# Patient Record
Sex: FEMALE | Race: WHITE | NOT HISPANIC OR LATINO | Employment: FULL TIME | URBAN - NONMETROPOLITAN AREA
[De-identification: names, ages, dates, MRNs, and addresses within clinical notes are randomized per-mention and may not be internally consistent; named-entity substitution may affect disease eponyms.]

---

## 2021-09-09 PROBLEM — R10.9 ABDOMINAL CRAMPING AFFECTING PREGNANCY: Status: ACTIVE | Noted: 2021-09-09

## 2021-09-09 PROBLEM — O26.899 ABDOMINAL CRAMPING AFFECTING PREGNANCY: Status: ACTIVE | Noted: 2021-09-09

## 2021-11-05 PROBLEM — O36.5931 IUGR (INTRAUTERINE GROWTH RESTRICTION) AFFECTING CARE OF MOTHER, THIRD TRIMESTER, FETUS 1: Status: ACTIVE | Noted: 2021-11-05

## 2023-04-24 ENCOUNTER — TELEMEDICINE (OUTPATIENT)
Dept: BEHAVIORAL HEALTH | Facility: CLINIC | Age: 23
End: 2023-04-24
Payer: COMMERCIAL

## 2023-04-24 DIAGNOSIS — F41.1 GENERALIZED ANXIETY DISORDER: Primary | ICD-10-CM

## 2023-04-24 PROCEDURE — 90832 PSYTX W PT 30 MINUTES: CPT | Performed by: COUNSELOR

## 2023-05-08 NOTE — PROGRESS NOTES
Telehealth Video Therapy Visit      Date: 2023     Patient Name: Flavia Mcclure  : 2000   Time In: 3:20  Time Out: 3:39    Disclosure: You have chosen to receive care through a video visit today. Do you consent to use the phone and/or a video visit for your behavioral health today? Yes       Chief Complaint:      ICD-10-CM ICD-9-CM   1. Generalized anxiety disorder  F41.1 300.02       History of Present Illness: Flavia Mcclure is a 22 y.o. female that has agreed to be seen via a telehealth visit today. Flavia Mcclure has stated that they are in a secure environment for this session. [facility] [address] [facility] The patient is seen remotely at her  home, using Epic Video Visit (HIPAA compliant).  Patient states that she has been a little sick with a sinus infection.  Besides having a sinus infection, the patient states that she has been depressed and anxious regarding her work.  The patient's work is ending and her last date is Friday, because it is seasonal.  The patient states that she will be able to draw unemployment, and will start again in October or November timeframe.  Discussed with the patient that she will be having to do a new timeframe and new schedule.  Encouraged the patient and her  to do a family meeting at the end of the week to discuss menus and meal plans.  Discussed with the patient had her  has been spending a lot of money eating out.  The patient is appropriate for a telemedicine visit. I identified myself Anat Bello [facility]  along with my credentials of [facility].@ can refuse to be seen remotely at any time. The electronic data is encrypted and password protected, and the patient has been advised of the potential risks to privacy, not withstanding such measures.    Subjective      Review of Systems:   The following portions of the patient's history were reviewed and updated as  "appropriate: allergies, current medications, past family history, past medical history, past social history, past surgical history and problem list.    Review of Systems    Past Medical History:   Past Medical History:   Diagnosis Date   • Asthma    • Bipolar disorder    • Chronic bronchitis    • Depression    • Elevated cholesterol    • Frequent UTI    • GERD (gastroesophageal reflux disease)    • Hypothyroidism     \"born with only 1/2 my thyroid\"    • Kidney infection    • Migraines    • PCOS (polycystic ovarian syndrome)    • PONV (postoperative nausea and vomiting)    • Pre-existing type 2 diabetes affecting pregnancy, antepartum     dx age 13   • PTSD (post-traumatic stress disorder)    • Schizoaffective disorder        Past Surgical History:   Past Surgical History:   Procedure Laterality Date   • ANAL FISTULA REPAIR  2022   •  SECTION Bilateral 2021    Procedure:  SECTION PRIMARY;  Surgeon: Irineo Freire DO;  Location: [facility] LABOR DELIVERY;  Service: Obstetrics/Gynecology;  Laterality: Bilateral;   • CHOLECYSTECTOMY     • COLONOSCOPY     • DIAGNOSTIC LAPAROSCOPY N/A 2022    Procedure: DIAGNOSTIC LAPAROSCOPY with Lysis of Adhesions;  Surgeon: Irineo Freire DO;  Location: [facility] OR;  Service: Obstetrics/Gynecology;  Laterality: N/A;   • ENDOSCOPY     • TONSILLECTOMY         Family History:   Family History   Problem Relation Age of Onset   • Asthma Maternal Grandmother    • Asthma Paternal Grandmother    • Cancer Other    • Diabetes Other    • Heart attack Other    • Hyperlipidemia Other    • Hypertension Other    • Thyroid disease Other    • Migraines Other        Social History:   Social History     Socioeconomic History   • Marital status:    Tobacco Use   • Smoking status: Former     Types: Cigarettes     Quit date:      Years since quittin.3   • Smokeless tobacco: Never   Vaping Use   • Vaping Use: Every day   • Substances: Nicotine, " Flavoring   • Devices: Pre-filled or refillable cartridge   Substance and Sexual Activity   • Alcohol use: Yes     Comment: occasional   • Drug use: Not Currently     Types: Marijuana     Comment: last use 2018   • Sexual activity: Defer     Partners: Male     Birth control/protection: None       Medications:     Current Outpatient Medications:   •  atorvastatin (LIPITOR) 20 MG tablet, Take 1 tablet by mouth Daily., Disp: , Rfl:   •  cholecalciferol (VITAMIN D3) 25 MCG (1000 UT) tablet, Take 1 tablet by mouth Daily., Disp: , Rfl:   •  Continuous Blood Gluc Sensor (Dexcom G6 Sensor), , Disp: , Rfl:   •  Continuous Blood Gluc Transmit (Dexcom G6 Transmitter) misc, , Disp: , Rfl:   •  desvenlafaxine (Pristiq) 100 MG 24 hr tablet, Take 1 tablet by mouth Daily., Disp: 30 tablet, Rfl: 1  •  desvenlafaxine (PRISTIQ) 50 MG 24 hr tablet, Take 1 tablet by mouth Daily., Disp: 30 tablet, Rfl: 1  •  docusate sodium 100 MG capsule, Take 1 capsule by mouth 2 (Two) Times a Day., Disp: 30 capsule, Rfl: 0  •  hydrOXYzine (ATARAX) 10 MG tablet, Take 1 tablet by mouth 3 (Three) Times a Day As Needed for Itching., Disp: , Rfl:   •  ibuprofen (ADVIL,MOTRIN) 800 MG tablet, Take 1 tablet by mouth Every 8 (Eight) Hours As Needed for Mild Pain ., Disp: 40 tablet, Rfl: 0  •  levothyroxine (SYNTHROID, LEVOTHROID) 100 MCG tablet, Take 1 tablet by mouth Daily., Disp: , Rfl:   •  mirtazapine (Remeron) 15 MG tablet, Take 0.5 tablets by mouth At Night As Needed (Sleeping Difficulties)., Disp: 30 tablet, Rfl: 1  •  ondansetron ODT (ZOFRAN-ODT) 4 MG disintegrating tablet, , Disp: , Rfl:   •  OXcarbazepine (Trileptal) 150 MG tablet, Take 1 tablet by mouth Every Night., Disp: 30 tablet, Rfl: 1  •  prazosin (Minipress) 2 MG capsule, Take 1 capsule by mouth Every Night., Disp: 30 capsule, Rfl: 1  •  propranolol (INDERAL) 10 MG tablet, Take 1 tablet by mouth 2 (Two) Times a Day As Needed (panic)., Disp: 60 tablet, Rfl: 1  •  QUEtiapine (SEROquel) 50 MG  tablet, Take 1 tablet by mouth Daily As Needed (anxiety, distruptive thoughts)., Disp: 30 tablet, Rfl: 1  •  QUEtiapine XR (SEROquel XR) 400 MG 24 hr tablet, Take 1 tablet by mouth Every Night., Disp: 30 tablet, Rfl: 1  •  SUMAtriptan (IMITREX) 50 MG tablet, Take 1 tablet by mouth Every 2 (Two) Hours As Needed for Migraine. Take one tablet at onset of headache. May repeat dose one time in 2 hours if headache not relieved., Disp: , Rfl:   •  vitamin B-12 (CYANOCOBALAMIN) 1000 MCG tablet, Take 1 tablet by mouth Daily., Disp: , Rfl:     Allergies:   Allergies   Allergen Reactions   • Morphine Anxiety   • Latex Rash   • Tylenol [Acetaminophen] Hives and Itching     Reaction was caused by interaction with sleep med.        PHQ-9 Score:   PHQ-9 Total Score:       Objective     Physical Exam:   not currently breastfeeding. There is no height or weight on file to calculate BMI.     Physical Exam    Patient's Support Network Includes:      Prognosis: Good with Ongoing Treatment     Mental Status Exam:   Hygiene:   good  Cooperation:  Cooperative  Eye Contact:  Good  Psychomotor Behavior:  Appropriate  Affect:  Appropriate  Mood: normal  Hopelessness: Denies  Speech:  Normal  Thought Process:  Goal directed  Thought Content:  Normal  Suicidal:  None  Homicidal:  None  Hallucinations:  None  Delusion:  None  Memory:  Intact  Orientation:  Person, Place and Time  Reliability:  good  Insight:  Good  Judgement:  Good  Impulse Control:  Good  Physical/Medical Issues:  No    Nothing has changed  Assessment / Plan      Assessment/Plan:   Diagnoses and all orders for this visit:    1. Generalized anxiety disorder (Primary)         1. The therapist will continue to promote the therapeutic alliance, address the patient's issues and concerns, and strengthen her self-awareness, insights, and positive coping skills.  These positive coping skills include visualization, music, breathing, exercising, and positive self talk.Encouraged  the patient and her  to have family meetings in which menu's for the week are discussed so they don't spend so much money eating out.     TREATMENT PLAN/GOALS: Continue supportive psychotherapy efforts and medications as indicated. Treatment and medication options discussed during today's visit. Patient ackowledged and verbally consented to continue with current treatment plan and was educated on the importance of compliance with treatment and follow-up appointments.     Counseled patient regarding multimodal approach with healthy nutrition, healthy sleep, regular physical activity, social activities, counseling, and medications.      Coping skills reviewed and encouraged positive framing of thoughts. No suicidal ideation or homicidal ideation at this time.      Assisted patient in processing above session content; acknowledged and normalized patient’s thoughts, feelings, and concerns.  Applied  positive coping skills and behavior management in session.    Allowed patient to freely discuss issues without interruption or judgment. Provided safe, confidential environment to facilitate the development of positive therapeutic relationship and encourage open, honest communication. Assisted patient in identifying risk factors which would indicate the need for higher level of care including thoughts to harm self or others and/or self-harming behavior and encouraged patient to contact this office, call 911, or present to the nearest emergency room should any of these events occur. Discussed crisis intervention services and means to access.     Follow Up:   No follow-ups on file.    TEDDY Kaplan  This document has been electronically signed by TEDDY Kaplan  May 8, 2023 15:30 EDT    Please note that portions of this note were completed with a voice recognition program. Efforts were made to edit dictation, but occasionally words are mistranscribed.

## 2023-05-15 ENCOUNTER — OFFICE VISIT (OUTPATIENT)
Dept: BEHAVIORAL HEALTH | Facility: CLINIC | Age: 23
End: 2023-05-15
Payer: COMMERCIAL

## 2023-05-15 DIAGNOSIS — F41.1 GENERALIZED ANXIETY DISORDER: Primary | ICD-10-CM
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
## 2023-06-05 ENCOUNTER — TELEMEDICINE (OUTPATIENT)
Dept: BEHAVIORAL HEALTH | Facility: CLINIC | Age: 23
End: 2023-06-05
Payer: COMMERCIAL

## 2023-06-05 DIAGNOSIS — F41.1 GENERALIZED ANXIETY DISORDER: Primary | ICD-10-CM

## 2023-06-05 DIAGNOSIS — F43.9 TRAUMA AND STRESSOR-RELATED DISORDER: ICD-10-CM

## 2023-06-05 PROCEDURE — 90834 PSYTX W PT 45 MINUTES: CPT | Performed by: COUNSELOR
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
## 2023-06-07 ENCOUNTER — TELEMEDICINE (OUTPATIENT)
Dept: BEHAVIORAL HEALTH | Facility: CLINIC | Age: 23
End: 2023-06-07
Payer: COMMERCIAL

## 2023-06-07 DIAGNOSIS — F25.9 SCHIZOAFFECTIVE DISORDER, UNSPECIFIED TYPE: Primary | ICD-10-CM

## 2023-06-07 DIAGNOSIS — F51.04 PSYCHOPHYSIOLOGICAL INSOMNIA: ICD-10-CM

## 2023-06-07 DIAGNOSIS — F41.1 GENERALIZED ANXIETY DISORDER: ICD-10-CM

## 2023-06-07 DIAGNOSIS — F31.30 BIPOLAR I DISORDER, MOST RECENT EPISODE DEPRESSED: ICD-10-CM

## 2023-06-07 PROCEDURE — 99214 OFFICE O/P EST MOD 30 MIN: CPT

## 2023-06-07 RX ORDER — MIRTAZAPINE 15 MG/1
7.5 TABLET, FILM COATED ORAL NIGHTLY PRN
Qty: 30 TABLET | Refills: 1 | Status: SHIPPED | OUTPATIENT
Start: 2023-06-07

## 2023-06-07 RX ORDER — QUETIAPINE 400 MG/1
400 TABLET, FILM COATED, EXTENDED RELEASE ORAL NIGHTLY
Qty: 30 TABLET | Refills: 1 | Status: SHIPPED | OUTPATIENT
Start: 2023-06-07

## 2023-06-07 RX ORDER — PRAZOSIN HYDROCHLORIDE 2 MG/1
2 CAPSULE ORAL NIGHTLY
Qty: 30 CAPSULE | Refills: 1 | Status: SHIPPED | OUTPATIENT
Start: 2023-06-07

## 2023-06-07 RX ORDER — OXCARBAZEPINE 150 MG/1
150 TABLET, FILM COATED ORAL 2 TIMES DAILY
Qty: 60 TABLET | Refills: 1 | Status: SHIPPED | OUTPATIENT
Start: 2023-06-07

## 2023-06-07 RX ORDER — DESVENLAFAXINE 100 MG/1
100 TABLET, EXTENDED RELEASE ORAL DAILY
Qty: 30 TABLET | Refills: 1 | Status: SHIPPED | OUTPATIENT
Start: 2023-06-07

## 2023-06-07 RX ORDER — PROPRANOLOL HYDROCHLORIDE 10 MG/1
10 TABLET ORAL 2 TIMES DAILY PRN
Qty: 60 TABLET | Refills: 1 | Status: SHIPPED | OUTPATIENT
Start: 2023-06-07

## 2023-06-07 RX ORDER — DESVENLAFAXINE SUCCINATE 50 MG/1
50 TABLET, EXTENDED RELEASE ORAL DAILY
Qty: 30 TABLET | Refills: 1 | Status: SHIPPED | OUTPATIENT
Start: 2023-06-07
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
## 2023-08-07 DIAGNOSIS — F31.30 BIPOLAR I DISORDER, MOST RECENT EPISODE DEPRESSED: ICD-10-CM

## 2023-08-07 DIAGNOSIS — F25.9 SCHIZOAFFECTIVE DISORDER, UNSPECIFIED TYPE: ICD-10-CM

## 2023-08-07 DIAGNOSIS — F41.1 GENERALIZED ANXIETY DISORDER: ICD-10-CM

## 2023-08-07 DIAGNOSIS — F51.04 PSYCHOPHYSIOLOGICAL INSOMNIA: ICD-10-CM

## 2023-08-07 RX ORDER — PRAZOSIN HYDROCHLORIDE 2 MG/1
2 CAPSULE ORAL NIGHTLY
Qty: 30 CAPSULE | Refills: 0 | Status: SHIPPED | OUTPATIENT
Start: 2023-08-07 | End: 2023-08-14 | Stop reason: DRUGHIGH

## 2023-08-07 RX ORDER — QUETIAPINE 400 MG/1
400 TABLET, FILM COATED, EXTENDED RELEASE ORAL NIGHTLY
Qty: 30 TABLET | Refills: 0 | Status: SHIPPED | OUTPATIENT
Start: 2023-08-07 | End: 2023-08-14 | Stop reason: SDUPTHER

## 2023-08-07 RX ORDER — OXCARBAZEPINE 150 MG/1
150 TABLET, FILM COATED ORAL 2 TIMES DAILY
Qty: 60 TABLET | Refills: 0 | Status: SHIPPED | OUTPATIENT
Start: 2023-08-07 | End: 2023-08-14 | Stop reason: SDUPTHER

## 2023-08-08 RX ORDER — PRAZOSIN HYDROCHLORIDE 2 MG/1
CAPSULE ORAL
Refills: 0 | OUTPATIENT
Start: 2023-08-08

## 2023-08-08 RX ORDER — QUETIAPINE 400 MG/1
TABLET, FILM COATED, EXTENDED RELEASE ORAL
Qty: 30 TABLET | Refills: 0 | OUTPATIENT
Start: 2023-08-08

## 2023-08-14 ENCOUNTER — TELEMEDICINE (OUTPATIENT)
Dept: BEHAVIORAL HEALTH | Facility: CLINIC | Age: 23
End: 2023-08-14
Payer: COMMERCIAL

## 2023-08-14 DIAGNOSIS — F41.1 GENERALIZED ANXIETY DISORDER: ICD-10-CM

## 2023-08-14 DIAGNOSIS — F31.30 BIPOLAR I DISORDER, MOST RECENT EPISODE DEPRESSED: Primary | ICD-10-CM

## 2023-08-14 DIAGNOSIS — F51.04 PSYCHOPHYSIOLOGICAL INSOMNIA: ICD-10-CM

## 2023-08-14 DIAGNOSIS — F25.9 SCHIZOAFFECTIVE DISORDER, UNSPECIFIED TYPE: ICD-10-CM

## 2023-08-14 DIAGNOSIS — F51.5 NIGHTMARES: ICD-10-CM

## 2023-08-14 PROCEDURE — 99214 OFFICE O/P EST MOD 30 MIN: CPT

## 2023-08-14 RX ORDER — OXCARBAZEPINE 150 MG/1
150 TABLET, FILM COATED ORAL 2 TIMES DAILY
Qty: 60 TABLET | Refills: 1 | Status: SHIPPED | OUTPATIENT
Start: 2023-08-14

## 2023-08-14 RX ORDER — DESVENLAFAXINE 100 MG/1
100 TABLET, EXTENDED RELEASE ORAL DAILY
Qty: 30 TABLET | Refills: 1 | Status: SHIPPED | OUTPATIENT
Start: 2023-08-14

## 2023-08-14 RX ORDER — PRAZOSIN HYDROCHLORIDE 5 MG/1
5 CAPSULE ORAL NIGHTLY
Qty: 30 CAPSULE | Refills: 1 | Status: SHIPPED | OUTPATIENT
Start: 2023-08-14

## 2023-08-14 RX ORDER — MIRTAZAPINE 15 MG/1
7.5 TABLET, FILM COATED ORAL NIGHTLY PRN
Qty: 30 TABLET | Refills: 1 | Status: SHIPPED | OUTPATIENT
Start: 2023-08-14

## 2023-08-14 RX ORDER — PROPRANOLOL HYDROCHLORIDE 10 MG/1
10 TABLET ORAL 2 TIMES DAILY PRN
Qty: 60 TABLET | Refills: 1 | Status: SHIPPED | OUTPATIENT
Start: 2023-08-14

## 2023-08-14 RX ORDER — QUETIAPINE 400 MG/1
400 TABLET, FILM COATED, EXTENDED RELEASE ORAL NIGHTLY
Qty: 30 TABLET | Refills: 1 | Status: SHIPPED | OUTPATIENT
Start: 2023-08-14

## 2023-08-14 NOTE — PROGRESS NOTES
This provider is located at [facility] ,[address], [address] in [place], [place],using a secure MyChart Video Visit through [employer]. Patient is being seen remotely via telehealth at their home address in [place], and stated they are in a secure environment for this session. The patient's condition being diagnosed/treated is appropriate for telemedicine. The provider identified herself as well as her credentials.   The patient, and/or patients guardian, consent to be seen remotely, and when consent is given they understand that the consent allows for patient identifiable information to be sent to a third party as needed.   They may refuse to be seen remotely at any time. The electronic data is encrypted and password protected, and the patient and/or guardian has been advised of the potential risks to privacy not withstanding such measures.    Video Visit    Patient Name: Flavia Ray  : 2000   MRN: [id number]   Care Team: Patient Care Team:  Paola Gutiérrez APRN as PCP - General (Nurse Practitioner)  Radha Silva APRN as Nurse Practitioner (Behavioral Health)         Chief Complaint:    Chief Complaint   Patient presents with    Bipolar    Anxiety    Schizoaffective Disorder    Sleeping Problem    Med Management       History of Present Illness: Flavia Ray is a 23 y.o. female who presents via video visit for a medication management follow up. Patient reports that she started seeing a new PCP and she took her off the 50 mg of Pristiq, increase her Hydroxyzine and told her that her Seroquel dose was too high. Patient reports that she doesn't feel any different since decreasing it. She states her biggest struggle right not is her sleep and nightmares. She feels that the Seroquel helps with her voices and suicidal thoughts so she is nervous to decrease it. She feels that her biggest struggle right now is her sleep and the nightmares. She  denies SI/HI today.     The following portion of the patient's history were reviewed and updated appropriately: allergies, current and past medications, family history, medical history and social history.  Subjective   Review of Systems:    Review of Systems   Psychiatric/Behavioral:  Positive for sleep disturbance, depressed mood and stress. The patient is nervous/anxious.    All other systems reviewed and are negative.    Current Medications:   Current Outpatient Medications   Medication Sig Dispense Refill    desvenlafaxine (Pristiq) 100 MG 24 hr tablet Take 1 tablet by mouth Daily. 30 tablet 1    mirtazapine (Remeron) 15 MG tablet Take 0.5 tablets by mouth At Night As Needed (Sleeping Difficulties). 30 tablet 1    OXcarbazepine (Trileptal) 150 MG tablet Take 1 tablet by mouth 2 (Two) Times a Day. 60 tablet 1    propranolol (INDERAL) 10 MG tablet Take 1 tablet by mouth 2 (Two) Times a Day As Needed (panic). 60 tablet 1    QUEtiapine XR (SEROquel XR) 400 MG 24 hr tablet Take 1 tablet by mouth Every Night. 30 tablet 1    atorvastatin (LIPITOR) 20 MG tablet Take 1 tablet by mouth Daily.      cholecalciferol (VITAMIN D3) 25 MCG (1000 UT) tablet Take 1 tablet by mouth Daily.      Continuous Blood Gluc Sensor (Dexcom G6 Sensor)       Continuous Blood Gluc Transmit (Dexcom G6 Transmitter) misc       docusate sodium 100 MG capsule Take 1 capsule by mouth 2 (Two) Times a Day. 30 capsule 0    ibuprofen (ADVIL,MOTRIN) 800 MG tablet Take 1 tablet by mouth Every 8 (Eight) Hours As Needed for Mild Pain . 40 tablet 0    levothyroxine (SYNTHROID, LEVOTHROID) 100 MCG tablet Take 1 tablet by mouth Daily.      ondansetron ODT (ZOFRAN-ODT) 4 MG disintegrating tablet       prazosin (Minipress) 5 MG capsule Take 1 capsule by mouth Every Night. 30 capsule 1    SUMAtriptan (IMITREX) 50 MG tablet Take 1 tablet by mouth Every 2 (Two) Hours As Needed for Migraine. Take one tablet at onset of headache. May repeat dose one time in 2 hours if  headache not relieved.      vitamin B-12 (CYANOCOBALAMIN) 1000 MCG tablet Take 1 tablet by mouth Daily.       No current facility-administered medications for this visit.       Mental Status Exam:   Hygiene:    unable to assess  Cooperation:  Cooperative  Eye Contact:  Good  Psychomotor Behavior:   unable to assess  Affect:  Appropriate  Mood: anxious and stressed  Speech:  Normal  Thought Process:  Goal directed and Linear  Thought Content:  Normal and Mood congruent  Suicidal:  None  Homicidal:  None  Hallucinations:  None  Delusion:  None  Memory:  Intact  Orientation:  Person, Place, Time, and Situation  Reliability:  good  Insight:  Good  Judgement:  Good  Impulse Control:  Good  Physical/Medical Issues:  Yes see chart      Objective   Vital Signs:   There were no vitals taken for this visit.      Assessment / Plan    Diagnoses and all orders for this visit:    1. Bipolar I disorder, most recent episode depressed (Primary)  -     desvenlafaxine (Pristiq) 100 MG 24 hr tablet; Take 1 tablet by mouth Daily.  Dispense: 30 tablet; Refill: 1  -     mirtazapine (Remeron) 15 MG tablet; Take 0.5 tablets by mouth At Night As Needed (Sleeping Difficulties).  Dispense: 30 tablet; Refill: 1  -     OXcarbazepine (Trileptal) 150 MG tablet; Take 1 tablet by mouth 2 (Two) Times a Day.  Dispense: 60 tablet; Refill: 1  -     QUEtiapine XR (SEROquel XR) 400 MG 24 hr tablet; Take 1 tablet by mouth Every Night.  Dispense: 30 tablet; Refill: 1    2. Generalized anxiety disorder  -     desvenlafaxine (Pristiq) 100 MG 24 hr tablet; Take 1 tablet by mouth Daily.  Dispense: 30 tablet; Refill: 1  -     mirtazapine (Remeron) 15 MG tablet; Take 0.5 tablets by mouth At Night As Needed (Sleeping Difficulties).  Dispense: 30 tablet; Refill: 1  -     propranolol (INDERAL) 10 MG tablet; Take 1 tablet by mouth 2 (Two) Times a Day As Needed (panic).  Dispense: 60 tablet; Refill: 1  -     QUEtiapine XR (SEROquel XR) 400 MG 24 hr tablet; Take 1  tablet by mouth Every Night.  Dispense: 30 tablet; Refill: 1    3. Psychophysiological insomnia  -     mirtazapine (Remeron) 15 MG tablet; Take 0.5 tablets by mouth At Night As Needed (Sleeping Difficulties).  Dispense: 30 tablet; Refill: 1  -     QUEtiapine XR (SEROquel XR) 400 MG 24 hr tablet; Take 1 tablet by mouth Every Night.  Dispense: 30 tablet; Refill: 1    4. Schizoaffective disorder, unspecified type  -     QUEtiapine XR (SEROquel XR) 400 MG 24 hr tablet; Take 1 tablet by mouth Every Night.  Dispense: 30 tablet; Refill: 1    5. Nightmares  -     prazosin (Minipress) 5 MG capsule; Take 1 capsule by mouth Every Night.  Dispense: 30 capsule; Refill: 1      Will increase Prazosin and discontinue Pristiq 50 mg. Continue all other medication as ordered.     A psychological evaluation was conducted in order to assess past and current level of functioning. Areas assessed included, but were not limited to: perception of social support, perception of ability to face and deal with challenges in life (positive functioning), anxiety symptoms, depressive symptoms, perspective on beliefs/belief system, coping skills for stress, intelligence level,  Therapeutic rapport was established. Interventions conducted today were geared towards incorporating medication management along with support for continued therapy. Education was also provided as to the med management with this provider and what to expect in subsequent sessions.      We discussed risks, benefits, goals and side effects of the above medication and the patient was agreeable with the plan. Patient was educated on the importance of compliance with treatment and follow-up appointments. Patient is aware to contact the [facility] with any worsening of symptoms. To call for questions or concerns and return early if necessary. Patent is agreeable to go to the Emergency Department or call 911 should they begin SI/HI.        MEDS ORDERED DURING VISIT:  New  Medications Ordered This Visit   Medications    prazosin (Minipress) 5 MG capsule     Sig: Take 1 capsule by mouth Every Night.     Dispense:  30 capsule     Refill:  1    desvenlafaxine (Pristiq) 100 MG 24 hr tablet     Sig: Take 1 tablet by mouth Daily.     Dispense:  30 tablet     Refill:  1    mirtazapine (Remeron) 15 MG tablet     Sig: Take 0.5 tablets by mouth At Night As Needed (Sleeping Difficulties).     Dispense:  30 tablet     Refill:  1    OXcarbazepine (Trileptal) 150 MG tablet     Sig: Take 1 tablet by mouth 2 (Two) Times a Day.     Dispense:  60 tablet     Refill:  1    propranolol (INDERAL) 10 MG tablet     Sig: Take 1 tablet by mouth 2 (Two) Times a Day As Needed (panic).     Dispense:  60 tablet     Refill:  1    QUEtiapine XR (SEROquel XR) 400 MG 24 hr tablet     Sig: Take 1 tablet by mouth Every Night.     Dispense:  30 tablet     Refill:  1         Follow Up   Return in about 6 weeks (around 9/25/2023).  Patient was given instructions and counseling regarding her condition or for health maintenance advice. Please see specific information pulled into the AVS if appropriate.     TREATMENT PLAN/GOALS: Continue supportive psychotherapy efforts and medications as indicated. Treatment and medication options discussed during today's visit. Patient acknowledged and verbally consented to continue with current treatment plan and was educated on the importance of compliance with treatment and follow-up appointments.    MEDICATION ISSUES:  Discussed medication options and treatment plan of prescribed medication as well as the risks, benefits, and side effects including potential falls, possible impaired driving and metabolic adversities among others. Patient is agreeable to call the office with any worsening of symptoms or onset of side effects. Patient is agreeable to call 911 or go to the nearest ER should he/she begin having SI/HI.        SAMSON Musa  [facility]  [facility]  [address] [place] [address]  [phone]    August 15, 2023 10:40 EDT

## 2023-09-05 DIAGNOSIS — F41.1 GENERALIZED ANXIETY DISORDER: ICD-10-CM

## 2023-09-05 RX ORDER — PROPRANOLOL HYDROCHLORIDE 10 MG/1
10 TABLET ORAL 2 TIMES DAILY PRN
Qty: 60 TABLET | Refills: 1 | Status: SHIPPED | OUTPATIENT
Start: 2023-09-05

## 2023-09-25 DIAGNOSIS — F31.30 BIPOLAR I DISORDER, MOST RECENT EPISODE DEPRESSED: ICD-10-CM

## 2023-09-25 DIAGNOSIS — F41.1 GENERALIZED ANXIETY DISORDER: ICD-10-CM

## 2023-09-25 RX ORDER — DESVENLAFAXINE 100 MG/1
100 TABLET, EXTENDED RELEASE ORAL DAILY
Qty: 30 TABLET | Refills: 1 | Status: SHIPPED | OUTPATIENT
Start: 2023-09-25

## 2023-09-25 NOTE — TELEPHONE ENCOUNTER
I HAVE COMPLETED A PRIOR AUTH FOR THIS MEDICATION AND PER THE PATIENT'S INSURANCE A PRIOR AUTH IS NOT REQUIRED AT THIS TIME.    I CONTACTED THE PATIENT'S PHARMACY.    PER THE PHARMACY THE PATIENT JUST NEED'S A REFILL ON THE PRISTIQ 100 MG.

## 2023-09-26 ENCOUNTER — OFFICE VISIT (OUTPATIENT)
Dept: OBSTETRICS AND GYNECOLOGY | Facility: CLINIC | Age: 23
End: 2023-09-26
Payer: COMMERCIAL

## 2023-09-26 VITALS
HEIGHT: 64 IN | BODY MASS INDEX: 38.24 KG/M2 | WEIGHT: 224 LBS | SYSTOLIC BLOOD PRESSURE: 128 MMHG | DIASTOLIC BLOOD PRESSURE: 70 MMHG

## 2023-09-26 DIAGNOSIS — Z97.5 IUD (INTRAUTERINE DEVICE) IN PLACE: ICD-10-CM

## 2023-09-26 DIAGNOSIS — N92.1 MENORRHAGIA WITH IRREGULAR CYCLE: Primary | ICD-10-CM

## 2023-09-26 DIAGNOSIS — Z87.42 HISTORY OF PCOS: ICD-10-CM

## 2023-09-26 DIAGNOSIS — N94.6 DYSMENORRHEA: ICD-10-CM

## 2023-09-26 DIAGNOSIS — Z87.42 HISTORY OF OVARIAN CYST: ICD-10-CM

## 2023-09-26 PROCEDURE — 99214 OFFICE O/P EST MOD 30 MIN: CPT | Performed by: OBSTETRICS & GYNECOLOGY

## 2023-09-26 RX ORDER — CELECOXIB 100 MG/1
100 CAPSULE ORAL
COMMUNITY

## 2023-09-26 RX ORDER — HYDROXYZINE 50 MG/1
50 TABLET, FILM COATED ORAL DAILY
COMMUNITY
Start: 2023-07-31

## 2023-09-26 RX ORDER — LEVONORGESTREL 19.5 MG/1
1 INTRAUTERINE DEVICE INTRAUTERINE ONCE
COMMUNITY

## 2023-09-26 RX ORDER — CYCLOBENZAPRINE HCL 10 MG
TABLET ORAL
COMMUNITY
Start: 2023-08-04

## 2023-09-27 NOTE — PROGRESS NOTES
Chief Complaint  Menorrhagia (Patient complains of irregular bleeding and pelvic pain. )     History of Present Illness:  Patient is 23 y.o.  who [facility] as a new patient for evaluation of heavy and irregular bleeding as well as pelvic pain.  Patient gives a history of having heavy and irregular bleeding.  She reports for the last year she has been bleeding continuously.  She had a diagnostic laparoscopy in December.  She did not have a D&C at that time.  She reports having some improvement in her bleeding for 1 month.  She has been having severe abdominal and pelvic pain as well as cramping.  She has continued to have the pain following her procedure.  She reports no improvement in the pain.  She reports the pain is in her lower abdomen and pelvis.  She will also have a fullness.  She also reports having pain in the right abdominal area which feels like a spasm.  She gives a history of having frequent hemorrhagic ovarian cyst.  She was diagnosed with polycystic ovarian syndrome at the age of 11.  She has a history of infertility.  It took her a year and a half to conceive.  She had a  section in .  This was secondary to breech presentation as well as fetal distress per patient report.  Patient reports she has been told following her delivery she did not need to get pregnant again.  She has had a previous abdominal ultrasound but not a recent transvaginal ultrasound.  She had placement of Kyleena IUD in April of this year.  She has not had a transvaginal ultrasound since that time.  She has not had any recent laboratory assessment.  She reports having a Pap smear in April of this year which was normal.  Patient had been seeing Dr. Schafer in [place] with [facility].  Patient reports previously being on oral contraceptives.  She also reports being on questionable Provera.    History  Past Medical History:   Diagnosis Date    Asthma     Bipolar disorder   "   Chronic bronchitis     Depression     Elevated cholesterol     Encounter for IUD insertion 04/2023    KYLEENA    Endometriosis     Female infertility     Frequent UTI     GERD (gastroesophageal reflux disease)     Hypothyroidism     \"born with only 1/2 my thyroid\"     Kidney infection     Migraines     PCOS (polycystic ovarian syndrome)     Polycystic ovary syndrome     PONV (postoperative nausea and vomiting)     Pre-existing type 2 diabetes affecting pregnancy, antepartum     dx age 13    Preeclampsia     PTSD (post-traumatic stress disorder)     Recurrent pregnancy loss, antepartum condition or complication     Schizoaffective disorder      Current Outpatient Medications on File Prior to Visit   Medication Sig Dispense Refill    atorvastatin (LIPITOR) 20 MG tablet Take 1 tablet by mouth Daily.      celecoxib (CeleBREX) 100 MG capsule Take 1 capsule by mouth.      cholecalciferol (VITAMIN D3) 25 MCG (1000 UT) tablet Take 1 tablet by mouth Daily.      Continuous Blood Gluc Sensor (Dexcom G6 Sensor)       Continuous Blood Gluc Transmit (Dexcom G6 Transmitter) misc       cyclobenzaprine (FLEXERIL) 10 MG tablet       desvenlafaxine (Pristiq) 100 MG 24 hr tablet Take 1 tablet by mouth Daily. 30 tablet 1    docusate sodium 100 MG capsule Take 1 capsule by mouth 2 (Two) Times a Day. 30 capsule 0    hydrOXYzine (ATARAX) 50 MG tablet Take 1 tablet by mouth Daily.      ibuprofen (ADVIL,MOTRIN) 800 MG tablet Take 1 tablet by mouth Every 8 (Eight) Hours As Needed for Mild Pain . 40 tablet 0    levonorgestrel (Kyleena) 19.5 MG intrauterine device IUD 1 each by Intrauterine route 1 (One) Time.      levothyroxine (SYNTHROID, LEVOTHROID) 100 MCG tablet Take 1 tablet by mouth Daily.      mirtazapine (Remeron) 15 MG tablet Take 0.5 tablets by mouth At Night As Needed (Sleeping Difficulties). 30 tablet 1    ondansetron ODT (ZOFRAN-ODT) 4 MG disintegrating tablet       OXcarbazepine (Trileptal) 150 MG tablet Take 1 tablet by mouth " 2 (Two) Times a Day. 60 tablet 1    prazosin (Minipress) 5 MG capsule Take 1 capsule by mouth Every Night. 30 capsule 1    propranolol (INDERAL) 10 MG tablet Take 1 tablet by mouth 2 (Two) Times a Day As Needed (panic). 60 tablet 1    QUEtiapine XR (SEROquel XR) 400 MG 24 hr tablet Take 1 tablet by mouth Every Night. 30 tablet 1    SUMAtriptan (IMITREX) 50 MG tablet Take 1 tablet by mouth Every 2 (Two) Hours As Needed for Migraine. Take one tablet at onset of headache. May repeat dose one time in 2 hours if headache not relieved.      vitamin B-12 (CYANOCOBALAMIN) 1000 MCG tablet Take 1 tablet by mouth Daily.       No current facility-administered medications on file prior to visit.     Allergies   Allergen Reactions    Morphine Anxiety    Latex Rash    Tylenol [Acetaminophen] Hives and Itching     Reaction was caused by interaction with sleep med.      Past Surgical History:   Procedure Laterality Date    ANAL FISTULA REPAIR  2022     SECTION Bilateral 2021    Procedure:  SECTION PRIMARY;  Surgeon: Irineo Freire DO;  Location: [facility] LABOR DELIVERY;  Service: Obstetrics/Gynecology;  Laterality: Bilateral;    CHOLECYSTECTOMY      COLONOSCOPY      DIAGNOSTIC LAPAROSCOPY N/A 2022    Procedure: DIAGNOSTIC LAPAROSCOPY with Lysis of Adhesions;  Surgeon: Irineo Freire DO;  Location: [facility] OR;  Service: Obstetrics/Gynecology;  Laterality: N/A;    ENDOSCOPY      TONSILLECTOMY       Family History   Problem Relation Age of Onset    Asthma Maternal Grandmother     Asthma Paternal Grandmother     Cancer Other     Diabetes Other     Heart attack Other     Hyperlipidemia Other     Hypertension Other     Thyroid disease Other     Migraines Other      Social History     Socioeconomic History    Marital status:    Tobacco Use    Smoking status: Former     Types: Cigarettes     Quit date:      Years since quittin.7    Smokeless tobacco: Never   Vaping Use    Vaping  "Use: Every day    Substances: Nicotine, Flavoring    Devices: Pre-filled or refillable cartridge   Substance and Sexual Activity    Alcohol use: Yes     Comment: occasional    Drug use: Not Currently     Types: Marijuana     Comment: last use 2018    Sexual activity: Defer     Partners: Male     Birth control/protection: None       Physical Examination:  Vital Signs: /70   Ht 162.6 cm (64\")   Wt 102 kg (224 lb)   BMI 38.45 kg/m²     General Appearance: alert, appears stated age, and cooperative  Breasts: Not performed.  Abdomen: no masses, no hepatomegaly, no splenomegaly, soft non-tender, no guarding, and no rebound tenderness  Pelvic: Not performed.    Data Review:  The following data was reviewed by: Kandis Forrester MD on 09/26/2023:     Labs:  hCG, serum, qualitative (07/26/2023 17:47)  CBC with Auto Diff (07/26/2023 17:47)  Urinalysis With Microscopic - (07/26/2023 17:50)  ABO/Rh (07/26/2023 17:51)  Imaging:  CT Abdomen Pelvis With Contrast (12/04/2022 19:52)  FL emily endo (surgery) (12/28/2022 11:35)  CT Abdomen Pelvis With Contrast (12/29/2022 21:15)  XR KNEE BILATERAL 3 VIEW Weight Bearing (07/14/2023 10:16)  US Non-ob Transvaginal (09/26/2023 13:29)  Medical Records:  Op Note by Irineo Freire DO (12/28/2022 11:42)   Op Note by Irineo Freire DO (11/23/2021 18:56)     Assessment and Plan   1. Menorrhagia with irregular cycle  The patient was informed that menstrual flow outside of normal volume, duration, regularity, or frequency is considered abnormal uterine bleeding.  The patient was informed that the normal duration of menstrual flow is usually 5 days and the normal cycle typically lasts between 21-35 days.  The patient was informed that heavy menstrual bleeding has been defined as blood loss greater than 80 mL and given that this is hard to quantify excessive blood loss is based on the patient's perception. The patient was informed that AUB most frequently occurs in women " aged 19-39 years as a result of pregnancy, structural lesions such as polyps or fibroids, anovulatory cycles, use of hormonal contraception, and endometrial hyperplasia.  She was counseled that endometrial cancer is less common but may occur.  It is recommended that she have a pregnancy test, CBC, and TSH.  Her pap smear needs to be up to date.  She needs screening for Chlamydia if she feels she is at high risk.  It is also recommended that she have a transvaginal ultrasound for further evaluation.  If anatomic abnormalities are noted then then surgery may be indicated. An endometrial ablation may also be an option to control bleeding in women who have completed childbearing.  The various options were discussed regarding medical management of her bleeding to include the use of nonsteroidal antiinflammatory drugs, progestins, combination oral contraceptives, a levonorgestrel intrauterine device, or tranexamic acid. The patient is informed if there is no improvement in her symptoms with medical management then she will need additional evaluation to include additional laboratory assessment and endometrial sampling.   Transvaginal ultrasound as noted.  Patient is to have fasting labs as discussed.  I have discussed with the patient at length the various options for management of her symptoms.  I have discussed with the patient a trial with continuous oral contraceptives with her Kyleena IUD.  Patient is to consider the options as discussed.  Labs as noted.  - US Non-ob Transvaginal; Future  - TSH; Future  - T4, Free; Future  - CBC & Differential; Future    2. Dysmenorrhea  Flavia Mcclure was counseled regarding the various etiologies for dysmenorrhea.  The patient was informed that primary dysmenorrhea is painful menstruation in the absence of pathology.  The various options for dysmenorrhea were discussed to include nonsteroidal antiinflammatory drugs, hormonal suppression, or both.  The patient was informed secondary  dysmenorrhea is a result of pelvic pathology and is more common in patients with severe dysmenorrhea at menarche or progressively worsening dysmenorrhea, abnormal uterine bleeding, mid-cycle or acyclic pain, infertility, family history of endometriosis, dyspareunia, or lack of response to empiric therapy.  Evaluation for secondary causes includes pelvic ultrasonography and possible laparoscopy.  The various treatment options for secondary dysmenorrhea depends upon the etiology as discussed.   - US Non-ob Transvaginal; Future    3. History of PCOS  Flavia Mcclure was informed that polycystic ovary syndrome (PCOS) is a disorder characterized by hyperandrogenism, ovulatory dysfunction, and polycystic ovaries.  There is no universal definition or diagnosis.  The exact etiology remains unknown but an insulin resistance plays a role.  Obesity is a comorbidity that may amplify the effects. Women with PCOS usually have menstrual disorders and infertility.  The patient was informed that patients with PCOS have a higher risk of complications during pregnancy, higher risk for diabetes and cardiovascular disease long term, and increased risk for endometrial cancer as well given chronic anovulation.  The patient was informed regarding the evaluation for PCOS to include ruling out other causes for hyperandrogenism and metabolic abnormalities.  Ultrasound evaluation was also discussed.  The various treatment options were discussed as well.  The patient was informed that weight loss can improve the excess circulating androgen levels and cause spontaneous resumption of her menses.  Weight loss can also improve pregnancy rates, decrease hirsutism, as well as improve glucose and lipid levels.  In women not planning on pregnancy, the various treatment options include combination hormonal contraception, progestins, insulin-sensitizing agents, in addition to lifestyle modification.  In women desiring pregnancy, the various options for  ovulation induction were discussed to include clomiphene and letrozole.  The addition of metformin was also discussed.  In addition to the above treatment, spironolactone can be added for treatment of hirsutism in women with PCOS.   - DHEA-Sulfate; Future  - 17-Hydroxyprogesterone; Future  - Insulin, Random; Future  - Prolactin; Future  - Testosterone, Free, Total; Future  - TSH; Future  - Follicle Stimulating Hormone; Future  - Estradiol; Future  - DHEA; Future  - T4, Free; Future  - CBC & Differential; Future  - Comprehensive Metabolic Panel; Future    4. History of ovarian cyst  Transvaginal ultrasound as noted.  Patient informed regarding those findings.  - US Non-ob Transvaginal; Future    5. IUD (intrauterine device) in place  IUD is currently in place.  Transvaginal ultrasound was obtained as noted.  Patient has been informed regarding those findings.    Follow Up/Instructions:  Follow up as noted.  Patient was given instructions and counseling regarding her condition or for health maintenance advice. Please see specific information pulled into the AVS if appropriate.     Note: Speech recognition transcription software may have been used to dictate portions of this document.  An attempt at proofreading has been made though minor errors in transcription may still be present.    This note was electronically signed.  Kandis Forrester M.D.

## 2023-09-30 ENCOUNTER — LAB (OUTPATIENT)
Dept: LAB | Facility: HOSPITAL | Age: 23
End: 2023-09-30
Payer: COMMERCIAL

## 2023-09-30 DIAGNOSIS — N92.1 MENORRHAGIA WITH IRREGULAR CYCLE: ICD-10-CM

## 2023-09-30 DIAGNOSIS — Z87.42 HISTORY OF PCOS: ICD-10-CM

## 2023-09-30 LAB
ALBUMIN SERPL-MCNC: 4.2 G/DL (ref 3.5–5.2)
ALBUMIN/GLOB SERPL: 1.4 G/DL
ALP SERPL-CCNC: 130 U/L (ref 39–117)
ALT SERPL W P-5'-P-CCNC: 19 U/L (ref 1–33)
ANION GAP SERPL CALCULATED.3IONS-SCNC: 13.8 MMOL/L (ref 5–15)
AST SERPL-CCNC: 23 U/L (ref 1–32)
BASOPHILS # BLD AUTO: 0.05 10*3/MM3 (ref 0–0.2)
BASOPHILS NFR BLD AUTO: 0.8 % (ref 0–1.5)
BILIRUB SERPL-MCNC: 0.3 MG/DL (ref 0–1.2)
BUN SERPL-MCNC: 19 MG/DL (ref 6–20)
BUN/CREAT SERPL: 23.8 (ref 7–25)
CALCIUM SPEC-SCNC: 9.3 MG/DL (ref 8.6–10.5)
CHLORIDE SERPL-SCNC: 102 MMOL/L (ref 98–107)
CO2 SERPL-SCNC: 21.2 MMOL/L (ref 22–29)
CREAT SERPL-MCNC: 0.8 MG/DL (ref 0.57–1)
DEPRECATED RDW RBC AUTO: 39.6 FL (ref 37–54)
EGFRCR SERPLBLD CKD-EPI 2021: 106.3 ML/MIN/1.73
EOSINOPHIL # BLD AUTO: 0.2 10*3/MM3 (ref 0–0.4)
EOSINOPHIL NFR BLD AUTO: 3 % (ref 0.3–6.2)
ERYTHROCYTE [DISTWIDTH] IN BLOOD BY AUTOMATED COUNT: 13.5 % (ref 12.3–15.4)
ESTRADIOL SERPL HS-MCNC: 70.6 PG/ML
FSH SERPL-ACNC: 7.63 MIU/ML
GLOBULIN UR ELPH-MCNC: 2.9 GM/DL
GLUCOSE SERPL-MCNC: 84 MG/DL (ref 65–99)
HCT VFR BLD AUTO: 41.4 % (ref 34–46.6)
HGB BLD-MCNC: 13.4 G/DL (ref 12–15.9)
IMM GRANULOCYTES # BLD AUTO: 0.01 10*3/MM3 (ref 0–0.05)
IMM GRANULOCYTES NFR BLD AUTO: 0.2 % (ref 0–0.5)
LYMPHOCYTES # BLD AUTO: 3.26 10*3/MM3 (ref 0.7–3.1)
LYMPHOCYTES NFR BLD AUTO: 49.7 % (ref 19.6–45.3)
MCH RBC QN AUTO: 26.5 PG (ref 26.6–33)
MCHC RBC AUTO-ENTMCNC: 32.4 G/DL (ref 31.5–35.7)
MCV RBC AUTO: 82 FL (ref 79–97)
MONOCYTES # BLD AUTO: 0.4 10*3/MM3 (ref 0.1–0.9)
MONOCYTES NFR BLD AUTO: 6.1 % (ref 5–12)
NEUTROPHILS NFR BLD AUTO: 2.64 10*3/MM3 (ref 1.7–7)
NEUTROPHILS NFR BLD AUTO: 40.2 % (ref 42.7–76)
NRBC BLD AUTO-RTO: 0 /100 WBC (ref 0–0.2)
PLATELET # BLD AUTO: 280 10*3/MM3 (ref 140–450)
PMV BLD AUTO: 10.1 FL (ref 6–12)
POTASSIUM SERPL-SCNC: 4.3 MMOL/L (ref 3.5–5.2)
PROLACTIN SERPL-MCNC: 13 NG/ML (ref 4.79–23.3)
PROT SERPL-MCNC: 7.1 G/DL (ref 6–8.5)
RBC # BLD AUTO: 5.05 10*6/MM3 (ref 3.77–5.28)
SODIUM SERPL-SCNC: 137 MMOL/L (ref 136–145)
T4 FREE SERPL-MCNC: 1.5 NG/DL (ref 0.93–1.7)
TSH SERPL DL<=0.05 MIU/L-ACNC: 0.99 UIU/ML (ref 0.27–4.2)
WBC NRBC COR # BLD: 6.56 10*3/MM3 (ref 3.4–10.8)

## 2023-09-30 PROCEDURE — 83498 ASY HYDROXYPROGESTERONE 17-D: CPT

## 2023-09-30 PROCEDURE — 84403 ASSAY OF TOTAL TESTOSTERONE: CPT

## 2023-09-30 PROCEDURE — 84439 ASSAY OF FREE THYROXINE: CPT

## 2023-09-30 PROCEDURE — 80050 GENERAL HEALTH PANEL: CPT

## 2023-09-30 PROCEDURE — 83525 ASSAY OF INSULIN: CPT

## 2023-09-30 PROCEDURE — 36415 COLL VENOUS BLD VENIPUNCTURE: CPT

## 2023-09-30 PROCEDURE — 82627 DEHYDROEPIANDROSTERONE: CPT

## 2023-09-30 PROCEDURE — 82626 DEHYDROEPIANDROSTERONE: CPT

## 2023-09-30 PROCEDURE — 84402 ASSAY OF FREE TESTOSTERONE: CPT

## 2023-09-30 PROCEDURE — 83001 ASSAY OF GONADOTROPIN (FSH): CPT

## 2023-09-30 PROCEDURE — 84146 ASSAY OF PROLACTIN: CPT

## 2023-09-30 PROCEDURE — 82670 ASSAY OF TOTAL ESTRADIOL: CPT

## 2023-10-01 LAB — DHEA-S SERPL-MCNC: 46.9 UG/DL (ref 110–431.7)

## 2023-10-04 LAB
17OHP SERPL-MCNC: 53 NG/DL
DHEA SERPL-MCNC: 98 NG/DL (ref 31–701)

## 2023-10-05 DIAGNOSIS — N92.1 MENORRHAGIA WITH IRREGULAR CYCLE: Primary | ICD-10-CM

## 2023-10-07 LAB
TESTOST FREE SERPL-MCNC: 0.3 PG/ML (ref 0–4.2)
TESTOST SERPL-MCNC: 20 NG/DL (ref 13–71)

## 2023-10-09 LAB — INSULIN SERPL-ACNC: 12 UIU/ML

## 2023-10-13 ENCOUNTER — LAB (OUTPATIENT)
Dept: LAB | Facility: HOSPITAL | Age: 23
End: 2023-10-13
Payer: COMMERCIAL

## 2023-10-13 LAB — CORTIS SERPL-MCNC: 6.18 MCG/DL

## 2023-10-13 PROCEDURE — 82533 TOTAL CORTISOL: CPT | Performed by: OBSTETRICS & GYNECOLOGY

## 2023-10-13 PROCEDURE — 82627 DEHYDROEPIANDROSTERONE: CPT | Performed by: OBSTETRICS & GYNECOLOGY

## 2023-10-14 LAB — DHEA-S SERPL-MCNC: 64.2 UG/DL (ref 110–431.7)

## 2023-10-25 ENCOUNTER — TELEPHONE (OUTPATIENT)
Dept: OBSTETRICS AND GYNECOLOGY | Facility: CLINIC | Age: 23
End: 2023-10-25
Payer: COMMERCIAL

## 2023-10-25 NOTE — TELEPHONE ENCOUNTER
Patient is having severe cramping , no bleeding . Has an appointment the end of this month. Has been taking naproxen daily for the pain. Would like Dr Forrester to advise      Thank You

## 2023-10-25 NOTE — TELEPHONE ENCOUNTER
Patient was curious about her estrogen level.  She says that you had mentioned to start her on oral contraceptives if it was low.

## 2023-10-27 RX ORDER — NORGESTIMATE AND ETHINYL ESTRADIOL 0.25-0.035
1 KIT ORAL DAILY
Qty: 84 EACH | Refills: 3 | Status: SHIPPED | OUTPATIENT
Start: 2023-10-27

## 2023-11-28 ENCOUNTER — OFFICE VISIT (OUTPATIENT)
Dept: OBSTETRICS AND GYNECOLOGY | Facility: CLINIC | Age: 23
End: 2023-11-28
Payer: COMMERCIAL

## 2023-11-28 ENCOUNTER — LAB (OUTPATIENT)
Dept: LAB | Facility: HOSPITAL | Age: 23
End: 2023-11-28
Payer: COMMERCIAL

## 2023-11-28 VITALS
WEIGHT: 221 LBS | HEIGHT: 64 IN | BODY MASS INDEX: 37.73 KG/M2 | DIASTOLIC BLOOD PRESSURE: 68 MMHG | SYSTOLIC BLOOD PRESSURE: 126 MMHG

## 2023-11-28 DIAGNOSIS — Z97.5 IUD (INTRAUTERINE DEVICE) IN PLACE: ICD-10-CM

## 2023-11-28 DIAGNOSIS — R10.32 LEFT LOWER QUADRANT PAIN: Primary | ICD-10-CM

## 2023-11-28 DIAGNOSIS — R11.0 NAUSEA WITHOUT VOMITING: ICD-10-CM

## 2023-11-28 DIAGNOSIS — N92.1 MENORRHAGIA WITH IRREGULAR CYCLE: ICD-10-CM

## 2023-11-28 DIAGNOSIS — N94.6 DYSMENORRHEA: ICD-10-CM

## 2023-11-28 DIAGNOSIS — E27.9 ADRENAL ABNORMALITY: ICD-10-CM

## 2023-11-28 LAB
ALBUMIN SERPL-MCNC: 4 G/DL (ref 3.5–5.2)
ALBUMIN/GLOB SERPL: 1.1 G/DL
ALP SERPL-CCNC: 117 U/L (ref 39–117)
ALT SERPL W P-5'-P-CCNC: 13 U/L (ref 1–33)
ANION GAP SERPL CALCULATED.3IONS-SCNC: 10.1 MMOL/L (ref 5–15)
AST SERPL-CCNC: 17 U/L (ref 1–32)
BASOPHILS # BLD AUTO: 0.04 10*3/MM3 (ref 0–0.2)
BASOPHILS NFR BLD AUTO: 0.6 % (ref 0–1.5)
BILIRUB SERPL-MCNC: 0.2 MG/DL (ref 0–1.2)
BUN SERPL-MCNC: 16 MG/DL (ref 6–20)
BUN/CREAT SERPL: 17 (ref 7–25)
CALCIUM SPEC-SCNC: 9.2 MG/DL (ref 8.6–10.5)
CHLORIDE SERPL-SCNC: 102 MMOL/L (ref 98–107)
CO2 SERPL-SCNC: 25.9 MMOL/L (ref 22–29)
CREAT SERPL-MCNC: 0.94 MG/DL (ref 0.57–1)
DEPRECATED RDW RBC AUTO: 40.8 FL (ref 37–54)
EGFRCR SERPLBLD CKD-EPI 2021: 87.6 ML/MIN/1.73
EOSINOPHIL # BLD AUTO: 0.16 10*3/MM3 (ref 0–0.4)
EOSINOPHIL NFR BLD AUTO: 2.3 % (ref 0.3–6.2)
ERYTHROCYTE [DISTWIDTH] IN BLOOD BY AUTOMATED COUNT: 13.5 % (ref 12.3–15.4)
GLOBULIN UR ELPH-MCNC: 3.6 GM/DL
GLUCOSE SERPL-MCNC: 115 MG/DL (ref 65–99)
HCT VFR BLD AUTO: 43.6 % (ref 34–46.6)
HGB BLD-MCNC: 14.3 G/DL (ref 12–15.9)
IMM GRANULOCYTES # BLD AUTO: 0.03 10*3/MM3 (ref 0–0.05)
IMM GRANULOCYTES NFR BLD AUTO: 0.4 % (ref 0–0.5)
LYMPHOCYTES # BLD AUTO: 3.26 10*3/MM3 (ref 0.7–3.1)
LYMPHOCYTES NFR BLD AUTO: 47 % (ref 19.6–45.3)
MCH RBC QN AUTO: 27.1 PG (ref 26.6–33)
MCHC RBC AUTO-ENTMCNC: 32.8 G/DL (ref 31.5–35.7)
MCV RBC AUTO: 82.7 FL (ref 79–97)
MONOCYTES # BLD AUTO: 0.38 10*3/MM3 (ref 0.1–0.9)
MONOCYTES NFR BLD AUTO: 5.5 % (ref 5–12)
NEUTROPHILS NFR BLD AUTO: 3.06 10*3/MM3 (ref 1.7–7)
NEUTROPHILS NFR BLD AUTO: 44.2 % (ref 42.7–76)
NRBC BLD AUTO-RTO: 0 /100 WBC (ref 0–0.2)
PLATELET # BLD AUTO: 291 10*3/MM3 (ref 140–450)
PMV BLD AUTO: 10.7 FL (ref 6–12)
POTASSIUM SERPL-SCNC: 4 MMOL/L (ref 3.5–5.2)
PROT SERPL-MCNC: 7.6 G/DL (ref 6–8.5)
RBC # BLD AUTO: 5.27 10*6/MM3 (ref 3.77–5.28)
SODIUM SERPL-SCNC: 138 MMOL/L (ref 136–145)
WBC NRBC COR # BLD AUTO: 6.93 10*3/MM3 (ref 3.4–10.8)

## 2023-11-28 PROCEDURE — 36415 COLL VENOUS BLD VENIPUNCTURE: CPT | Performed by: OBSTETRICS & GYNECOLOGY

## 2023-11-28 PROCEDURE — 99214 OFFICE O/P EST MOD 30 MIN: CPT | Performed by: OBSTETRICS & GYNECOLOGY

## 2023-11-28 PROCEDURE — 80053 COMPREHEN METABOLIC PANEL: CPT | Performed by: OBSTETRICS & GYNECOLOGY

## 2023-11-28 PROCEDURE — 85025 COMPLETE CBC W/AUTO DIFF WBC: CPT | Performed by: OBSTETRICS & GYNECOLOGY

## 2023-11-28 RX ORDER — DOXEPIN HYDROCHLORIDE 10 MG/ML
SOLUTION ORAL
COMMUNITY

## 2023-11-28 RX ORDER — LORATADINE 10 MG/1
1 TABLET ORAL DAILY
COMMUNITY

## 2023-11-28 NOTE — PROGRESS NOTES
"Chief Complaint  Dysmenorrhea (Patient complains of irregular spotting and cramping x 1 month. /)     History of Present Illness:  Patient is 23 y.o.  who presents to [facility] here for evaluation of left lower quadrant pain as well as continued vaginal bleeding.  Patient reports having continued spotting and bleeding.  She has been having severe cramping and pelvic pain.  She does report however the bleeding is lighter in nature since starting her oral contraceptives.  She did have previous transvaginal ultrasound as noted.  She reports however having severe left lower quadrant pain.  The pain feels like a squeezing sensation.  It is intermittent in nature.  She does have nausea.  She denies however any constipation or changes in her bowel movements.  She denies any difficulty with urination.  She denies any fever or chills.  She did have previous labs in the past.  Her DHEA-S level has been low.  She did see her endocrinologist.  She is scheduled for further testing for possible [place]'s disease next week.    History  Past Medical History:   Diagnosis Date    Asthma     Bipolar disorder     Chronic bronchitis     Depression     Elevated cholesterol     Encounter for IUD insertion 2023    KYLEENA    Endometriosis     Female infertility     Frequent UTI     GERD (gastroesophageal reflux disease)     Hypothyroidism     \"born with only 1/2 my thyroid\"     Kidney infection     Migraines     PCOS (polycystic ovarian syndrome)     Polycystic ovary syndrome     PONV (postoperative nausea and vomiting)     Pre-existing type 2 diabetes affecting pregnancy, antepartum     dx age 13    Preeclampsia     PTSD (post-traumatic stress disorder)     Recurrent pregnancy loss, antepartum condition or complication     Schizoaffective disorder      Current Outpatient Medications on File Prior to Visit   Medication Sig Dispense Refill    atorvastatin (LIPITOR) 20 MG tablet Take 1 tablet by mouth " Daily.      celecoxib (CeleBREX) 100 MG capsule Take 1 capsule by mouth.      cholecalciferol (VITAMIN D3) 25 MCG (1000 UT) tablet Take 1 tablet by mouth Daily.      Continuous Blood Gluc Sensor (Dexcom G6 Sensor)       Continuous Blood Gluc Transmit (Dexcom G6 Transmitter) misc       cyclobenzaprine (FLEXERIL) 10 MG tablet       desvenlafaxine (Pristiq) 100 MG 24 hr tablet Take 1 tablet by mouth Daily. 30 tablet 1    docusate sodium 100 MG capsule Take 1 capsule by mouth 2 (Two) Times a Day. 30 capsule 0    doxepin (SINEquan) 10 MG/ML solution       hydrOXYzine (ATARAX) 50 MG tablet Take 1 tablet by mouth Daily.      ibuprofen (ADVIL,MOTRIN) 800 MG tablet Take 1 tablet by mouth Every 8 (Eight) Hours As Needed for Mild Pain . 40 tablet 0    levonorgestrel (Kyleena) 19.5 MG intrauterine device IUD 1 each by Intrauterine route 1 (One) Time.      levothyroxine (SYNTHROID, LEVOTHROID) 100 MCG tablet Take 1 tablet by mouth Daily.      loratadine (Claritin) 10 MG tablet Take 1 tablet by mouth Daily.      metFORMIN (GLUCOPHAGE) 500 MG tablet       mirtazapine (Remeron) 15 MG tablet Take 0.5 tablets by mouth At Night As Needed (Sleeping Difficulties). 30 tablet 1    norgestimate-ethinyl estradiol (ORTHO-CYCLEN) 0.25-35 MG-MCG per tablet Take 1 tablet by mouth Daily. 84 each 3    ondansetron ODT (ZOFRAN-ODT) 4 MG disintegrating tablet       OXcarbazepine (Trileptal) 150 MG tablet Take 1 tablet by mouth 2 (Two) Times a Day. 60 tablet 1    prazosin (Minipress) 5 MG capsule Take 1 capsule by mouth Every Night. 30 capsule 1    propranolol (INDERAL) 10 MG tablet Take 1 tablet by mouth 2 (Two) Times a Day As Needed (panic). 60 tablet 1    QUEtiapine XR (SEROquel XR) 400 MG 24 hr tablet Take 1 tablet by mouth Every Night. 30 tablet 1    SUMAtriptan (IMITREX) 50 MG tablet Take 1 tablet by mouth Every 2 (Two) Hours As Needed for Migraine. Take one tablet at onset of headache. May repeat dose one time in 2 hours if headache not  "relieved.      vitamin B-12 (CYANOCOBALAMIN) 1000 MCG tablet Take 1 tablet by mouth Daily.       No current facility-administered medications on file prior to visit.     Allergies   Allergen Reactions    Morphine Anxiety    Latex Rash    Tylenol [Acetaminophen] Hives and Itching     Reaction was caused by interaction with sleep med.      Past Surgical History:   Procedure Laterality Date    ANAL FISTULA REPAIR  2022     SECTION Bilateral 2021    Procedure:  SECTION PRIMARY;  Surgeon: Irineo Freire DO;  Location: [facility] LABOR DELIVERY;  Service: Obstetrics/Gynecology;  Laterality: Bilateral;    CHOLECYSTECTOMY      COLONOSCOPY      DIAGNOSTIC LAPAROSCOPY N/A 2022    Procedure: DIAGNOSTIC LAPAROSCOPY with Lysis of Adhesions;  Surgeon: Irineo Freire DO;  Location: [facility] OR;  Service: Obstetrics/Gynecology;  Laterality: N/A;    ENDOSCOPY      TONSILLECTOMY       Family History   Problem Relation Age of Onset    Asthma Maternal Grandmother     Asthma Paternal Grandmother     Cancer Other     Diabetes Other     Heart attack Other     Hyperlipidemia Other     Hypertension Other     Thyroid disease Other     Migraines Other      Social History     Socioeconomic History    Marital status:    Tobacco Use    Smoking status: Former     Types: Cigarettes     Quit date:      Years since quittin.9    Smokeless tobacco: Never   Vaping Use    Vaping Use: Every day    Substances: Nicotine, Flavoring    Devices: Pre-filled or refillable cartridge   Substance and Sexual Activity    Alcohol use: Yes     Comment: occasional    Drug use: Not Currently     Types: Marijuana     Comment: last use 2018    Sexual activity: Defer     Partners: Male     Birth control/protection: None       Physical Examination:  Vital Signs: /68   Ht 162.6 cm (64\")   Wt 100 kg (221 lb)   BMI 37.93 kg/m²     General Appearance: alert, appears stated age, and cooperative  Breasts: Not " performed.  Abdomen: no masses, no hepatomegaly, no splenomegaly, soft non-tender, no guarding, and no rebound tenderness  Pelvic: Not performed.    Data Review:  The following data was reviewed by: Kandis Forrester MD on 11/28/2023:     Labs:    Imaging:  US Non-ob Transvaginal (11/28/2023 15:24)   Medical Records:  None    Assessment and Plan   1. Left lower quadrant pain  Patient with left lower quadrant pain as noted.  Labs today as discussed.  Will schedule CT scan of abdomen and pelvis with IV and oral contrast for further evaluation.  Plan pending results.  - US Non-ob Transvaginal; Future  - CBC & Differential  - Comprehensive Metabolic Panel  - CT Abdomen Pelvis With Contrast; Future    2. Menorrhagia with irregular cycle  Transvaginal ultrasound was obtained today.  Patient has been informed regarding those findings.  She is to continue her current medication as previously given.  She is to follow-up as discussed.  - US Non-ob Transvaginal; Future  - CBC & Differential  - Comprehensive Metabolic Panel  - CT Abdomen Pelvis With Contrast; Future    3. Dysmenorrhea  Patient with continued dysmenorrhea as noted.  She is to continue with her oral contraceptives as discussed.  - US Non-ob Transvaginal; Future  - CT Abdomen Pelvis With Contrast; Future    4. IUD (intrauterine device) in place  IUD is noted to be in place.  Patient has been informed regarding those findings.  She desires to continue with her IUD at present.  - US Non-ob Transvaginal; Future  - CT Abdomen Pelvis With Contrast; Future    5. Nausea without vomiting  Patient with nausea without any emesis.  CBC and labs today as noted.  CT scan of abdomen and pelvis for further evaluation.  - CBC & Differential  - Comprehensive Metabolic Panel  - CT Abdomen Pelvis With Contrast; Future    6. Adrenal abnormality  Patient with abnormal DHEA-S level.  Patient is to keep her follow-up appointment with endocrinology.  She sees Dr. Lewis today.  She is to  sign to obtain a copy of her medical records.  CT scan of abdomen and pelvis as noted.    Follow Up/Instructions:  Follow up as noted.  Patient was given instructions and counseling regarding her condition or for health maintenance advice. Please see specific information pulled into the AVS if appropriate.     Note: Speech recognition transcription software may have been used to dictate portions of this document.  An attempt at proofreading has been made though minor errors in transcription may still be present.    This note was electronically signed.  Kandis Forrester M.D.

## 2023-12-03 ENCOUNTER — HOSPITAL ENCOUNTER (EMERGENCY)
Facility: HOSPITAL | Age: 23
Discharge: HOME | End: 2023-12-03
Attending: EMERGENCY MEDICINE | Admitting: EMERGENCY MEDICINE
Payer: COMMERCIAL

## 2023-12-03 ENCOUNTER — APPOINTMENT (OUTPATIENT)
Dept: CT IMAGING | Facility: HOSPITAL | Age: 23
End: 2023-12-03
Payer: COMMERCIAL

## 2023-12-03 VITALS
BODY MASS INDEX: 38.04 KG/M2 | HEIGHT: 64 IN | DIASTOLIC BLOOD PRESSURE: 80 MMHG | SYSTOLIC BLOOD PRESSURE: 118 MMHG | RESPIRATION RATE: 18 BRPM | HEART RATE: 82 BPM | WEIGHT: 222.8 LBS | OXYGEN SATURATION: 97 % | TEMPERATURE: 98.1 F

## 2023-12-03 DIAGNOSIS — N39.0 URINARY TRACT INFECTION WITHOUT HEMATURIA, SITE UNSPECIFIED: Primary | ICD-10-CM

## 2023-12-03 DIAGNOSIS — R33.9 URINARY RETENTION: ICD-10-CM

## 2023-12-03 DIAGNOSIS — R10.9 FLANK PAIN: ICD-10-CM

## 2023-12-03 LAB
ALBUMIN SERPL-MCNC: 3.9 G/DL (ref 3.5–5.2)
ALBUMIN/GLOB SERPL: 1.2 G/DL
ALP SERPL-CCNC: 117 U/L (ref 39–117)
ALT SERPL W P-5'-P-CCNC: 11 U/L (ref 1–33)
ANION GAP SERPL CALCULATED.3IONS-SCNC: 9.5 MMOL/L (ref 5–15)
AST SERPL-CCNC: 13 U/L (ref 1–32)
B-HCG UR QL: NEGATIVE
BACTERIA UR QL AUTO: ABNORMAL /HPF
BASOPHILS # BLD AUTO: 0.06 10*3/MM3 (ref 0–0.2)
BASOPHILS NFR BLD AUTO: 0.7 % (ref 0–1.5)
BILIRUB SERPL-MCNC: 0.2 MG/DL (ref 0–1.2)
BILIRUB UR QL STRIP: NEGATIVE
BUN SERPL-MCNC: 13 MG/DL (ref 6–20)
BUN/CREAT SERPL: 14.6 (ref 7–25)
CALCIUM SPEC-SCNC: 8.7 MG/DL (ref 8.6–10.5)
CHLORIDE SERPL-SCNC: 104 MMOL/L (ref 98–107)
CLARITY UR: CLEAR
CO2 SERPL-SCNC: 24.5 MMOL/L (ref 22–29)
COLOR UR: YELLOW
CREAT SERPL-MCNC: 0.89 MG/DL (ref 0.57–1)
DEPRECATED RDW RBC AUTO: 39.5 FL (ref 37–54)
EGFRCR SERPLBLD CKD-EPI 2021: 93.6 ML/MIN/1.73
EOSINOPHIL # BLD AUTO: 0.25 10*3/MM3 (ref 0–0.4)
EOSINOPHIL NFR BLD AUTO: 2.9 % (ref 0.3–6.2)
ERYTHROCYTE [DISTWIDTH] IN BLOOD BY AUTOMATED COUNT: 13.1 % (ref 12.3–15.4)
GLOBULIN UR ELPH-MCNC: 3.3 GM/DL
GLUCOSE SERPL-MCNC: 85 MG/DL (ref 65–99)
GLUCOSE UR STRIP-MCNC: NEGATIVE MG/DL
HCT VFR BLD AUTO: 43 % (ref 34–46.6)
HGB BLD-MCNC: 14.3 G/DL (ref 12–15.9)
HGB UR QL STRIP.AUTO: ABNORMAL
HYALINE CASTS UR QL AUTO: ABNORMAL /LPF
IMM GRANULOCYTES # BLD AUTO: 0.02 10*3/MM3 (ref 0–0.05)
IMM GRANULOCYTES NFR BLD AUTO: 0.2 % (ref 0–0.5)
KETONES UR QL STRIP: ABNORMAL
LEUKOCYTE ESTERASE UR QL STRIP.AUTO: NEGATIVE
LIPASE SERPL-CCNC: 37 U/L (ref 13–60)
LYMPHOCYTES # BLD AUTO: 4.26 10*3/MM3 (ref 0.7–3.1)
LYMPHOCYTES NFR BLD AUTO: 49.9 % (ref 19.6–45.3)
MCH RBC QN AUTO: 27.4 PG (ref 26.6–33)
MCHC RBC AUTO-ENTMCNC: 33.3 G/DL (ref 31.5–35.7)
MCV RBC AUTO: 82.5 FL (ref 79–97)
MONOCYTES # BLD AUTO: 0.67 10*3/MM3 (ref 0.1–0.9)
MONOCYTES NFR BLD AUTO: 7.9 % (ref 5–12)
MUCOUS THREADS URNS QL MICRO: ABNORMAL /HPF
NEUTROPHILS NFR BLD AUTO: 3.27 10*3/MM3 (ref 1.7–7)
NEUTROPHILS NFR BLD AUTO: 38.4 % (ref 42.7–76)
NITRITE UR QL STRIP: NEGATIVE
NRBC BLD AUTO-RTO: 0 /100 WBC (ref 0–0.2)
PH UR STRIP.AUTO: 6 [PH] (ref 5–8)
PLATELET # BLD AUTO: 310 10*3/MM3 (ref 140–450)
PMV BLD AUTO: 9.9 FL (ref 6–12)
POTASSIUM SERPL-SCNC: 3.9 MMOL/L (ref 3.5–5.2)
PROT SERPL-MCNC: 7.2 G/DL (ref 6–8.5)
PROT UR QL STRIP: NEGATIVE
RBC # BLD AUTO: 5.21 10*6/MM3 (ref 3.77–5.28)
RBC # UR STRIP: ABNORMAL /HPF
REF LAB TEST METHOD: ABNORMAL
SODIUM SERPL-SCNC: 138 MMOL/L (ref 136–145)
SP GR UR STRIP: 1.03 (ref 1–1.03)
SQUAMOUS #/AREA URNS HPF: ABNORMAL /HPF
UROBILINOGEN UR QL STRIP: ABNORMAL
WBC # UR STRIP: ABNORMAL /HPF
WBC NRBC COR # BLD AUTO: 8.53 10*3/MM3 (ref 3.4–10.8)

## 2023-12-03 PROCEDURE — 99285 EMERGENCY DEPT VISIT HI MDM: CPT

## 2023-12-03 PROCEDURE — 25510000001 IOPAMIDOL 61 % SOLUTION: Performed by: EMERGENCY MEDICINE

## 2023-12-03 PROCEDURE — 83690 ASSAY OF LIPASE: CPT

## 2023-12-03 PROCEDURE — 96361 HYDRATE IV INFUSION ADD-ON: CPT

## 2023-12-03 PROCEDURE — P9612 CATHETERIZE FOR URINE SPEC: HCPCS

## 2023-12-03 PROCEDURE — 25010000002 ONDANSETRON PER 1 MG

## 2023-12-03 PROCEDURE — 96375 TX/PRO/DX INJ NEW DRUG ADDON: CPT

## 2023-12-03 PROCEDURE — 25810000003 SODIUM CHLORIDE 0.9 % SOLUTION

## 2023-12-03 PROCEDURE — 81025 URINE PREGNANCY TEST: CPT

## 2023-12-03 PROCEDURE — 80053 COMPREHEN METABOLIC PANEL: CPT

## 2023-12-03 PROCEDURE — 96374 THER/PROPH/DIAG INJ IV PUSH: CPT

## 2023-12-03 PROCEDURE — 85025 COMPLETE CBC W/AUTO DIFF WBC: CPT

## 2023-12-03 PROCEDURE — 74177 CT ABD & PELVIS W/CONTRAST: CPT

## 2023-12-03 PROCEDURE — 25010000002 KETOROLAC TROMETHAMINE PER 15 MG: Performed by: EMERGENCY MEDICINE

## 2023-12-03 PROCEDURE — 81001 URINALYSIS AUTO W/SCOPE: CPT

## 2023-12-03 RX ORDER — ONDANSETRON 2 MG/ML
4 INJECTION INTRAMUSCULAR; INTRAVENOUS ONCE
Status: COMPLETED | OUTPATIENT
Start: 2023-12-03 | End: 2023-12-03

## 2023-12-03 RX ORDER — CEPHALEXIN 500 MG/1
500 CAPSULE ORAL 2 TIMES DAILY
Qty: 14 CAPSULE | Refills: 0 | Status: SHIPPED | OUTPATIENT
Start: 2023-12-03 | End: 2023-12-05

## 2023-12-03 RX ORDER — KETOROLAC TROMETHAMINE 30 MG/ML
15 INJECTION, SOLUTION INTRAMUSCULAR; INTRAVENOUS ONCE
Status: DISCONTINUED | OUTPATIENT
Start: 2023-12-03 | End: 2023-12-03

## 2023-12-03 RX ORDER — KETOROLAC TROMETHAMINE 30 MG/ML
15 INJECTION, SOLUTION INTRAMUSCULAR; INTRAVENOUS ONCE
Status: COMPLETED | OUTPATIENT
Start: 2023-12-03 | End: 2023-12-03

## 2023-12-03 RX ORDER — CEPHALEXIN 250 MG/1
500 CAPSULE ORAL ONCE
Status: COMPLETED | OUTPATIENT
Start: 2023-12-03 | End: 2023-12-03

## 2023-12-03 RX ORDER — ONDANSETRON 4 MG/1
4 TABLET, ORALLY DISINTEGRATING ORAL EVERY 8 HOURS PRN
Qty: 12 TABLET | Refills: 0 | Status: SHIPPED | OUTPATIENT
Start: 2023-12-03

## 2023-12-03 RX ADMIN — ONDANSETRON 4 MG: 2 INJECTION INTRAMUSCULAR; INTRAVENOUS at 20:28

## 2023-12-03 RX ADMIN — SODIUM CHLORIDE 1000 ML: 9 INJECTION, SOLUTION INTRAVENOUS at 20:28

## 2023-12-03 RX ADMIN — KETOROLAC TROMETHAMINE 15 MG: 30 INJECTION, SOLUTION INTRAMUSCULAR at 22:08

## 2023-12-03 RX ADMIN — IOPAMIDOL 100 ML: 612 INJECTION, SOLUTION INTRAVENOUS at 21:05

## 2023-12-03 RX ADMIN — CEPHALEXIN 500 MG: 250 CAPSULE ORAL at 23:31

## 2023-12-04 NOTE — DISCHARGE INSTRUCTIONS
Follow-up with urology, their numbers been attached, call to schedule follow-up appointment.  I have additionally sent a antibiotic to your pharmacy, take this as directed.  Follow-up closely with your primary care and continue your home medications as prescribed.  Make sure to take the full course of antibiotics as directed.  If your symptoms significantly worsen return to the emergency department.

## 2023-12-04 NOTE — ED PROVIDER NOTES
Subjective  History of Present Illness:    This is a 23-year-old female, history of asthma chronic bronchitis depression endometriosis frequent UTI, hypothyroidism, pyelonephritis, PCOS, preeclampsia presenting the emergency room today for evaluation of bilateral flank pain.  Patient reports that she has been having this flank pain for several weeks.  Some nausea without significant vomiting.  Reports some intermittent suprapubic pain.  Recently had a transvaginal ultrasound to evaluate for torsion that was negative.  She reports that she has not been able to urinate since 5 PM yesterday, feels the urge to urinate but is unable to.  No history of nephrolithiasis.  No vaginal bleeding.  No dysuria hematuria urgency or frequency.  She reports that she is drinking plenty of fluids.  She denies any history of urologic procedures.  Denies any steroid use.  No urinary or fecal incontinence and no saddle anesthesias.  She reports she is currently being worked up for Frederic's disease and has a CT with contrast oral and IV scheduled for later on this month.  Denies any history of back trauma      Nurses Notes reviewed and agree, including vitals, allergies, social history and prior medical history.     REVIEW OF SYSTEMS: All systems reviewed and not pertinent unless noted.  Review of Systems   Constitutional:  Negative for fever.   Gastrointestinal:  Positive for abdominal pain and nausea. Negative for vomiting.   Genitourinary:  Positive for difficulty urinating and flank pain. Negative for dysuria, frequency, hematuria, pelvic pain, urgency, vaginal bleeding and vaginal discharge.   All other systems reviewed and are negative.      Past Medical History:   Diagnosis Date    Asthma     Bipolar disorder     Chronic bronchitis     Depression     Elevated cholesterol     Encounter for IUD insertion 04/2023    KYLEENA    Endometriosis     Female infertility     Frequent UTI     GERD (gastroesophageal reflux disease)      "Hypothyroidism     \"born with only 1/2 my thyroid\"     Kidney infection     Migraines     PCOS (polycystic ovarian syndrome)     Polycystic ovary syndrome     PONV (postoperative nausea and vomiting)     Pre-existing type 2 diabetes affecting pregnancy, antepartum     dx age 13    Preeclampsia     PTSD (post-traumatic stress disorder)     Recurrent pregnancy loss, antepartum condition or complication     Schizoaffective disorder        Allergies:    Morphine, Latex, and Tylenol [acetaminophen]      Past Surgical History:   Procedure Laterality Date    ANAL FISTULA REPAIR  2022     SECTION Bilateral 2021    Procedure:  SECTION PRIMARY;  Surgeon: Irineo Freire DO;  Location: [facility] LABOR DELIVERY;  Service: Obstetrics/Gynecology;  Laterality: Bilateral;    CHOLECYSTECTOMY      COLONOSCOPY      DIAGNOSTIC LAPAROSCOPY N/A 2022    Procedure: DIAGNOSTIC LAPAROSCOPY with Lysis of Adhesions;  Surgeon: Irineo Freire DO;  Location: [facility] OR;  Service: Obstetrics/Gynecology;  Laterality: N/A;    ENDOSCOPY      TONSILLECTOMY           Social History     Socioeconomic History    Marital status:    Tobacco Use    Smoking status: Former     Types: Cigarettes     Quit date:      Years since quittin.9    Smokeless tobacco: Never   Vaping Use    Vaping Use: Every day    Substances: Nicotine, Flavoring    Devices: Pre-filled or refillable cartridge   Substance and Sexual Activity    Alcohol use: Yes     Comment: occasional    Drug use: Not Currently     Types: Marijuana     Comment: last use     Sexual activity: Defer     Partners: Male     Birth control/protection: None         Family History   Problem Relation Age of Onset    Asthma Maternal Grandmother     Asthma Paternal Grandmother     Cancer Other     Diabetes Other     Heart attack Other     Hyperlipidemia Other     Hypertension Other     Thyroid disease Other     Migraines Other        Objective  Physical " "Exam:  /80 (BP Location: Left arm, Patient Position: Sitting)   Pulse 82   Temp 98.1 °F (36.7 °C) (Oral)   Resp 18   Ht 162.6 cm (64\")   Wt 101 kg (222 lb 12.8 oz)   LMP 10/31/2023 (Approximate)   SpO2 97%   BMI 38.24 kg/m²      Physical Exam  Vitals and nursing note reviewed.   Constitutional:       General: She is not in acute distress.     Appearance: She is obese. She is not ill-appearing, toxic-appearing or diaphoretic.   HENT:      Head: Normocephalic and atraumatic.      Nose: Nose normal.      Mouth/Throat:      Mouth: Mucous membranes are moist.      Pharynx: Oropharynx is clear.   Eyes:      Extraocular Movements: Extraocular movements intact.   Cardiovascular:      Rate and Rhythm: Normal rate and regular rhythm.      Pulses: Normal pulses.      Heart sounds: Normal heart sounds.   Pulmonary:      Effort: Pulmonary effort is normal. No respiratory distress.      Breath sounds: Normal breath sounds. No stridor. No wheezing, rhonchi or rales.   Abdominal:      General: There is no distension.      Palpations: Abdomen is soft.      Tenderness: There is no abdominal tenderness. There is right CVA tenderness and left CVA tenderness. There is no guarding.   Musculoskeletal:         General: Normal range of motion.      Cervical back: Normal range of motion.   Skin:     General: Skin is warm and dry.      Capillary Refill: Capillary refill takes less than 2 seconds.   Neurological:      General: No focal deficit present.      Mental Status: She is alert and oriented to person, place, and time.   Psychiatric:         Mood and Affect: Mood normal.         Behavior: Behavior normal.         Thought Content: Thought content normal.         Judgment: Judgment normal.               Procedures    ED Course:         Lab Results (last 24 hours)       Procedure Component Value Units Date/Time    CBC Auto Differential [[id number]]  (Abnormal) Collected: 12/03/23 2008    Specimen: Blood Updated: 12/03/23 2014 "     WBC 8.53 10*3/mm3      RBC 5.21 10*6/mm3      Hemoglobin 14.3 g/dL      Hematocrit 43.0 %      MCV 82.5 fL      MCH 27.4 pg      MCHC 33.3 g/dL      RDW 13.1 %      RDW-SD 39.5 fl      MPV 9.9 fL      Platelets 310 10*3/mm3      Neutrophil % 38.4 %      Lymphocyte % 49.9 %      Monocyte % 7.9 %      Eosinophil % 2.9 %      Basophil % 0.7 %      Immature Grans % 0.2 %      Neutrophils, Absolute 3.27 10*3/mm3      Lymphocytes, Absolute 4.26 10*3/mm3      Monocytes, Absolute 0.67 10*3/mm3      Eosinophils, Absolute 0.25 10*3/mm3      Basophils, Absolute 0.06 10*3/mm3      Immature Grans, Absolute 0.02 10*3/mm3      nRBC 0.0 /100 WBC     Comprehensive Metabolic Panel [[id number]] Collected: 12/03/23 2008    Specimen: Blood Updated: 12/03/23 2053     Glucose 85 mg/dL      BUN 13 mg/dL      Creatinine 0.89 mg/dL      Sodium 138 mmol/L      Potassium 3.9 mmol/L      Chloride 104 mmol/L      CO2 24.5 mmol/L      Calcium 8.7 mg/dL      Total Protein 7.2 g/dL      Albumin 3.9 g/dL      ALT (SGPT) 11 U/L      AST (SGOT) 13 U/L      Alkaline Phosphatase 117 U/L      Total Bilirubin 0.2 mg/dL      Globulin 3.3 gm/dL      A/G Ratio 1.2 g/dL      BUN/Creatinine Ratio 14.6     Anion Gap 9.5 mmol/L      eGFR 93.6 mL/min/1.73     Narrative:      GFR Normal >60  Chronic Kidney Disease <60  Kidney Failure <15      Lipase [[id number]]  (Normal) Collected: 12/03/23 2008    Specimen: Blood Updated: 12/03/23 2053     Lipase 37 U/L     Pregnancy, Urine - Straight Cath [[id number]]  (Normal) Collected: 12/03/23 2027    Specimen: Urine from Straight Cath Updated: 12/03/23 2055     HCG, Urine QL Negative    Urinalysis With Culture If Indicated - Straight Cath [[id number]]  (Abnormal) Collected: 12/03/23 2027    Specimen: Urine from Straight Cath Updated: 12/03/23 2055     Color, UA Yellow     Appearance, UA Clear     pH, UA 6.0     Specific Gravity, UA 1.027     Glucose, UA Negative     Ketones, UA Trace     Bilirubin, UA Negative     Blood,  UA Small (1+)     Protein, UA Negative     Leuk Esterase, UA Negative     Nitrite, UA Negative     Urobilinogen, UA 0.2 E.U./dL    Narrative:      In absence of clinical symptoms, the presence of pyuria, bacteria, and/or nitrites on the urinalysis result does not correlate with infection.    Urinalysis, Microscopic Only - Straight Cath [[id number]]  (Abnormal) Collected: 12/03/23 2027    Specimen: Urine from Straight Cath Updated: 12/03/23 2112     RBC, UA None Seen /HPF      WBC, UA 11-20 /HPF      Bacteria, UA Trace /HPF      Squamous Epithelial Cells, UA 7-12 /HPF      Hyaline Casts, UA None Seen /LPF      Mucus, UA Large/3+ /HPF      Methodology Manual Light Microscopy             CT Abdomen Pelvis With Contrast    Result Date: 12/3/2023  FINAL REPORT TECHNIQUE: Postcontrast axial images through the abdomen and pelvis were performed. This study was performed with techniques to keep radiation doses as low as reasonably achievable, (ALARA). Individualized dose reduction techniques using automated exposure control or adjustment of mA and/or kV according to the patient's size were employed. CLINICAL HISTORY: difficulty urinating, bilateral flank pain, FINDINGS: Abdomen: The lung bases are clear.  The liver is normal in size and attenuation.  The patient status post cholecystectomy.  The spleen is unremarkable.  The adrenals are normal.  The pancreas is unremarkable. The kidneys enhance appropriately. There is no hydronephrosis, renal calculi, or evidence of a solid renal mass. The aorta is normal in caliber. No free air, free fluid or adenopathy is identified. No findings for mechanical bowel obstruction are identified. Pelvis: The appendix is normal.  An IUD is noted within an otherwise normal uterus.  The urinary bladder is minimally distended, limiting evaluation. No free fluid, free air, abscess or adenopathy is identified.     Impression: Urinary bladder is minimally distended, limiting evaluation, please  correlate with urinalysis given the patient's symptomology. No definite evidence of an acute abnormality in the abdomen/pelvis. Authenticated and Electronically Signed by MD Fischer Richard on 12/03/2023 11:40:23 PM        MDM      Initial impression of presenting illness: This is a 23-year-old female present emergency room today for evaluation of bilateral flank pain ongoing for the last several weeks.  She is additionally endorsing difficulty in urinating.    DDX: includes but is not limited to: Urinary retention, nephrolithiasis, pyelonephritis, cystitis, urinary tract infection, urethral stricture, intra-abdominal infection, others  She is on hydroxyzine, this could be a medication that induces some urinary retention at times given that it is an antihistamine.    Patient arrives hemodynamically stable afebrile nontachycardic nonhypoxic and nontoxic-appearing with vitals interpreted by myself.     Pertinent features from physical exam: Well-appearing 23-year-old female no acute distress.  Cardiac auscultation with regular rate and rhythm lungs were clear bilaterally.  Minimal bilateral CVA tenderness to palpation.  Nontoxic-appearing.  Abdomen nonreactive..    Initial diagnostic plan: CBC CMP lipase urinalysis urine pregnancy CT abdomen pelvis with contrast and bladder scan    Results from initial plan were reviewed and interpreted by me revealing CBC unremarkable, CMP with normal renal studies, normal lipase, urinalysis with negative hCG.  Small blood, trace ketones.  Bladder scan revealed 200 mL in the bladder.  CT abdomen pelvis per radiology revealed urinary bladder is minimally distended limiting evaluation otherwise negative.    Diagnostic information from other sources: Old record reviewed    Interventions / Re-evaluation: Zofran 1 L fluids.  In-N-Out cath to relieve urinary retention.  First dose of Keflex in the emergency department.  Stable for discharge.  Additionally given Toradol for pain  control.  Better after interventions and stable for discharge home.  She does not wish to have a Acharya catheter placed.    Results/clinical rationale were discussed with patient at bedside.  Recommended urinary Acharya cath given that she has had some retention this evening, however patient declined and wanted to try antibiotics to see if that helped her to improve her ability to urinate.  She was given strict return precautions will return for further urinary retention.    Consultations/Discussion of results with other physicians: N/A    Disposition plan: Discharge.  Sent Keflex.  Urine culture pending.  Additionally sent Zofran.  Recommend follow-up with urology and her primary care physician.  Recommend continuing other medications as prescribed.  -----    Final diagnoses:   Urinary tract infection without hematuria, site unspecified   Flank pain   Urinary retention          Geoff Storm PA-C  12/04/23 0103

## 2023-12-05 ENCOUNTER — OFFICE VISIT (OUTPATIENT)
Dept: UROLOGY | Facility: CLINIC | Age: 23
End: 2023-12-05
Payer: COMMERCIAL

## 2023-12-05 VITALS
HEART RATE: 76 BPM | TEMPERATURE: 97.7 F | BODY MASS INDEX: 37.9 KG/M2 | WEIGHT: 222 LBS | HEIGHT: 64 IN | DIASTOLIC BLOOD PRESSURE: 90 MMHG | OXYGEN SATURATION: 97 % | RESPIRATION RATE: 18 BRPM | SYSTOLIC BLOOD PRESSURE: 118 MMHG

## 2023-12-05 DIAGNOSIS — R33.9 URINARY RETENTION: ICD-10-CM

## 2023-12-05 DIAGNOSIS — N30.01 ACUTE CYSTITIS WITH HEMATURIA: Primary | ICD-10-CM

## 2023-12-05 RX ORDER — LEVOFLOXACIN 250 MG/1
250 TABLET, FILM COATED ORAL DAILY
Qty: 3 TABLET | Refills: 0 | Status: SHIPPED | OUTPATIENT
Start: 2023-12-05 | End: 2023-12-08

## 2023-12-05 NOTE — PROGRESS NOTES
"Chief Complaint  Frequent UTI    Referring Provider:  No ref. provider found    HPI  Patient is a 23 y.o. female who presents as a referral for multiple UTIs and urinary retention.    She reports approximately 6 infections in the last 6 months. This is the only time she has developed lacy retention. Aside from UTI symptoms, denies any changes in medications recently, or use of any OTC meds for sleep or allergy.    UTI symptoms include urgency without urinary output, frequency    Patient Denies current Sx: yes    Symptoms resolve promptly with antibiotics:  yes  History of hospitalization for UTI?     no  Records of positive urine cultures?    no  Ongoing problems with constipation?  yes, chronic, taking Colace only       Past Medical History  Past Medical History:   Diagnosis Date    Asthma     Bipolar disorder     Chronic bronchitis     Depression     Elevated cholesterol     Encounter for IUD insertion 2023    KYLEENA    Endometriosis     Female infertility     Frequent UTI     GERD (gastroesophageal reflux disease)     Hypothyroidism     \"born with only 1/2 my thyroid\"     Kidney infection     Migraines     PCOS (polycystic ovarian syndrome)     Polycystic ovary syndrome     PONV (postoperative nausea and vomiting)     Pre-existing type 2 diabetes affecting pregnancy, antepartum     dx age 13    Preeclampsia     PTSD (post-traumatic stress disorder)     Recurrent pregnancy loss, antepartum condition or complication     Schizoaffective disorder        Past Surgical History  Past Surgical History:   Procedure Laterality Date    ANAL FISTULA REPAIR  2022     SECTION Bilateral 2021    Procedure:  SECTION PRIMARY;  Surgeon: Irineo Freire DO;  Location: [facility] LABOR DELIVERY;  Service: Obstetrics/Gynecology;  Laterality: Bilateral;    CHOLECYSTECTOMY      COLONOSCOPY      DIAGNOSTIC LAPAROSCOPY N/A 2022    Procedure: DIAGNOSTIC LAPAROSCOPY with Lysis of Adhesions;  Surgeon: " Irineo Freire DO;  Location: [facility];  Service: Obstetrics/Gynecology;  Laterality: N/A;    ENDOSCOPY      TONSILLECTOMY         Medications    Current Outpatient Medications:     atorvastatin (LIPITOR) 20 MG tablet, Take 1 tablet by mouth Daily., Disp: , Rfl:     celecoxib (CeleBREX) 100 MG capsule, Take 1 capsule by mouth., Disp: , Rfl:     cephalexin (KEFLEX) 500 MG capsule, Take 1 capsule by mouth 2 (Two) Times a Day for 7 days., Disp: 14 capsule, Rfl: 0    cholecalciferol (VITAMIN D3) 25 MCG (1000 UT) tablet, Take 1 tablet by mouth Daily., Disp: , Rfl:     Continuous Blood Gluc Sensor (Dexcom G6 Sensor), , Disp: , Rfl:     Continuous Blood Gluc Transmit (Dexcom G6 Transmitter) misc, , Disp: , Rfl:     cyclobenzaprine (FLEXERIL) 10 MG tablet, , Disp: , Rfl:     desvenlafaxine (Pristiq) 100 MG 24 hr tablet, Take 1 tablet by mouth Daily., Disp: 30 tablet, Rfl: 1    docusate sodium 100 MG capsule, Take 1 capsule by mouth 2 (Two) Times a Day., Disp: 30 capsule, Rfl: 0    doxepin (SINEquan) 10 MG/ML solution, , Disp: , Rfl:     hydrOXYzine (ATARAX) 50 MG tablet, Take 1 tablet by mouth Daily., Disp: , Rfl:     ibuprofen (ADVIL,MOTRIN) 800 MG tablet, Take 1 tablet by mouth Every 8 (Eight) Hours As Needed for Mild Pain ., Disp: 40 tablet, Rfl: 0    levonorgestrel (Kyleena) 19.5 MG intrauterine device IUD, 1 each by Intrauterine route 1 (One) Time., Disp: , Rfl:     levothyroxine (SYNTHROID, LEVOTHROID) 100 MCG tablet, Take 1 tablet by mouth Daily., Disp: , Rfl:     loratadine (Claritin) 10 MG tablet, Take 1 tablet by mouth Daily., Disp: , Rfl:     metFORMIN (GLUCOPHAGE) 500 MG tablet, , Disp: , Rfl:     mirtazapine (Remeron) 15 MG tablet, Take 0.5 tablets by mouth At Night As Needed (Sleeping Difficulties)., Disp: 30 tablet, Rfl: 1    norgestimate-ethinyl estradiol (ORTHO-CYCLEN) 0.25-35 MG-MCG per tablet, Take 1 tablet by mouth Daily., Disp: 84 each, Rfl: 3    ondansetron ODT (ZOFRAN-ODT) 4 MG  disintegrating tablet, , Disp: , Rfl:     ondansetron ODT (ZOFRAN-ODT) 4 MG disintegrating tablet, Place 1 tablet on the tongue Every 8 (Eight) Hours As Needed for Nausea or Vomiting., Disp: 12 tablet, Rfl: 0    OXcarbazepine (Trileptal) 150 MG tablet, Take 1 tablet by mouth 2 (Two) Times a Day., Disp: 60 tablet, Rfl: 1    prazosin (Minipress) 5 MG capsule, Take 1 capsule by mouth Every Night., Disp: 30 capsule, Rfl: 1    propranolol (INDERAL) 10 MG tablet, Take 1 tablet by mouth 2 (Two) Times a Day As Needed (panic)., Disp: 60 tablet, Rfl: 1    QUEtiapine XR (SEROquel XR) 400 MG 24 hr tablet, Take 1 tablet by mouth Every Night., Disp: 30 tablet, Rfl: 1    SUMAtriptan (IMITREX) 50 MG tablet, Take 1 tablet by mouth Every 2 (Two) Hours As Needed for Migraine. Take one tablet at onset of headache. May repeat dose one time in 2 hours if headache not relieved., Disp: , Rfl:     vitamin B-12 (CYANOCOBALAMIN) 1000 MCG tablet, Take 1 tablet by mouth Daily., Disp: , Rfl:     Allergies  Allergies   Allergen Reactions    Morphine Anxiety    Latex Rash    Tylenol [Acetaminophen] Hives and Itching     Reaction was caused by interaction with sleep med.        Social History  Social History     Socioeconomic History    Marital status:    Tobacco Use    Smoking status: Former     Types: Cigarettes     Quit date:      Years since quittin.9    Smokeless tobacco: Never   Vaping Use    Vaping Use: Every day    Substances: Nicotine, Flavoring    Devices: Pre-filled or refillable cartridge   Substance and Sexual Activity    Alcohol use: Yes     Comment: occasional    Drug use: Not Currently     Types: Marijuana     Comment: last use     Sexual activity: Defer     Partners: Male     Birth control/protection: None       Family History  Family History   Problem Relation Age of Onset    Asthma Maternal Grandmother     Asthma Paternal Grandmother     Cancer Other     Diabetes Other     Heart attack Other     Hyperlipidemia  "Other     Hypertension Other     Thyroid disease Other     Migraines Other          Physical Exam  Visit Vitals  /90 (BP Location: Right arm, Patient Position: Sitting, Cuff Size: Adult)   Pulse 76   Temp 97.7 °F (36.5 °C) (Temporal)   Resp 18   Ht 162.6 cm (64.02\")   Wt 101 kg (222 lb)   LMP 10/31/2023 (Approximate)   SpO2 97%   BMI 38.09 kg/m²         Labs       Brief Urine Lab Results  (Last result in the past 365 days)        Color   Clarity   Blood   Leuk Est   Nitrite   Protein   CREAT   Urine HCG        12/03/23 2027               Negative       12/03/23 2027 Yellow   Clear   Small (1+)   Negative   Negative   Negative                     Post-Void Residual  A post-void residual was measured by ultrasonic bladder scanner by staff: 255cc      Radiographic Studies  CT Abdomen Pelvis With Contrast    Result Date: 12/3/2023  Urinary bladder is minimally distended, limiting evaluation, please correlate with urinalysis given the patient's symptomology. No definite evidence of an acute abnormality in the abdomen/pelvis. Authenticated and Electronically Signed by MD Fischer Richard on 12/03/2023 11:40:23 PM        Assessment  Kami is a 23 y.o. female who presents with recurrent, nearly monthly UTI and urinary retention.      She presents in extreme discomfort, experiencing ongoing urinary retention.  She tells me that her local pharmacy was unable to get cephalexin until this morning, so she is untreated. 16Fr catheter placed by MA with patient experiencing immediate relief and about 300cc of red, cloudy UOP.    Previous urine testing significant for: Blood and protein.  She does not believe that her primary care has been obtaining urine cultures.    The patient's risk factors to developing recurrent UTIs include chronic constipation.    We discussed the importance of obtaining urine cultures and defining if the patient is having true bacterial infections versus symptoms of another underlying disease such " as interstitial cystitis.  She also tells me that she has endometriosis and we talked about the rare incidence of endometrial implant in the bladder or ureter.  We will watch for her urine culture to come back from the emergency department and change her coverage to more broad-spectrum antibiotics with Levaquin.  I recommend she start MiraLAX at least daily and titrate up frequency as needed to produce a daily bowel movement.    We can address further recurrent UTI risk factors at follow-up.      Plan  1. Encourage fluid consumption, goal of 64oz fluid daily, ideally water  2.  Pending urine culture from the emergency department, recommend transitioning to broader spectrum coverage, Levaquin to 50 mg daily  3.  Start at least daily MiraLAX  4.  Perform fill and void at follow-up and obtain PCR

## 2023-12-08 ENCOUNTER — OFFICE VISIT (OUTPATIENT)
Dept: UROLOGY | Facility: CLINIC | Age: 23
End: 2023-12-08
Payer: COMMERCIAL

## 2023-12-08 DIAGNOSIS — N30.01 ACUTE CYSTITIS WITH HEMATURIA: Primary | ICD-10-CM

## 2023-12-08 RX ORDER — PHENAZOPYRIDINE HYDROCHLORIDE 200 MG/1
200 TABLET, FILM COATED ORAL 3 TIMES DAILY PRN
Qty: 15 TABLET | Refills: 1 | Status: SHIPPED | OUTPATIENT
Start: 2023-12-08

## 2023-12-08 NOTE — PROGRESS NOTES
"Chief Complaint   Patient presents with    Urinary Retention        HPI  Ms. Mcclure is a 23 y.o. female with history of endometriosis, Demar and urinary retention who presents for follow up.     At this visit, tells me that she has been working diligently on drinking lots of water and taking MiraLAX daily to improve frequency of bowel movements.  Her urine has become more normal and yellow in color and she is hopeful to have the catheter removed today.    Past Medical History:   Diagnosis Date    Asthma     Bipolar disorder     Chronic bronchitis     Depression     Elevated cholesterol     Encounter for IUD insertion 2023    KYLEENA    Endometriosis     Female infertility     Frequent UTI     GERD (gastroesophageal reflux disease)     Hypothyroidism     \"born with only 1/2 my thyroid\"     Kidney infection     Migraines     PCOS (polycystic ovarian syndrome)     Polycystic ovary syndrome     PONV (postoperative nausea and vomiting)     Pre-existing type 2 diabetes affecting pregnancy, antepartum     dx age 13    Preeclampsia     PTSD (post-traumatic stress disorder)     Recurrent pregnancy loss, antepartum condition or complication     Schizoaffective disorder        Past Surgical History:   Procedure Laterality Date    ANAL FISTULA REPAIR  2022     SECTION Bilateral 2021    Procedure:  SECTION PRIMARY;  Surgeon: Irineo Freire DO;  Location: [facility] LABOR DELIVERY;  Service: Obstetrics/Gynecology;  Laterality: Bilateral;    CHOLECYSTECTOMY      COLONOSCOPY      DIAGNOSTIC LAPAROSCOPY N/A 2022    Procedure: DIAGNOSTIC LAPAROSCOPY with Lysis of Adhesions;  Surgeon: Irineo Freire DO;  Location: [facility] OR;  Service: Obstetrics/Gynecology;  Laterality: N/A;    ENDOSCOPY      TONSILLECTOMY           Current Outpatient Medications:     atorvastatin (LIPITOR) 20 MG tablet, Take 1 tablet by mouth Daily., Disp: , Rfl:     celecoxib (CeleBREX) 100 MG capsule, Take 1 capsule by " mouth., Disp: , Rfl:     cholecalciferol (VITAMIN D3) 25 MCG (1000 UT) tablet, Take 1 tablet by mouth Daily., Disp: , Rfl:     Continuous Blood Gluc Sensor (Dexcom G6 Sensor), , Disp: , Rfl:     Continuous Blood Gluc Transmit (Dexcom G6 Transmitter) misc, , Disp: , Rfl:     cyclobenzaprine (FLEXERIL) 10 MG tablet, , Disp: , Rfl:     desvenlafaxine (Pristiq) 100 MG 24 hr tablet, Take 1 tablet by mouth Daily., Disp: 30 tablet, Rfl: 1    docusate sodium 100 MG capsule, Take 1 capsule by mouth 2 (Two) Times a Day., Disp: 30 capsule, Rfl: 0    doxepin (SINEquan) 10 MG/ML solution, , Disp: , Rfl:     hydrOXYzine (ATARAX) 50 MG tablet, Take 1 tablet by mouth Daily., Disp: , Rfl:     ibuprofen (ADVIL,MOTRIN) 800 MG tablet, Take 1 tablet by mouth Every 8 (Eight) Hours As Needed for Mild Pain ., Disp: 40 tablet, Rfl: 0    levoFLOXacin (Levaquin) 250 MG tablet, Take 1 tablet by mouth Daily for 3 days., Disp: 3 tablet, Rfl: 0    levonorgestrel (Kyleena) 19.5 MG intrauterine device IUD, 1 each by Intrauterine route 1 (One) Time., Disp: , Rfl:     levothyroxine (SYNTHROID, LEVOTHROID) 100 MCG tablet, Take 1 tablet by mouth Daily., Disp: , Rfl:     loratadine (Claritin) 10 MG tablet, Take 1 tablet by mouth Daily., Disp: , Rfl:     metFORMIN (GLUCOPHAGE) 500 MG tablet, , Disp: , Rfl:     mirtazapine (Remeron) 15 MG tablet, Take 0.5 tablets by mouth At Night As Needed (Sleeping Difficulties)., Disp: 30 tablet, Rfl: 1    norgestimate-ethinyl estradiol (ORTHO-CYCLEN) 0.25-35 MG-MCG per tablet, Take 1 tablet by mouth Daily., Disp: 84 each, Rfl: 3    ondansetron ODT (ZOFRAN-ODT) 4 MG disintegrating tablet, , Disp: , Rfl:     ondansetron ODT (ZOFRAN-ODT) 4 MG disintegrating tablet, Place 1 tablet on the tongue Every 8 (Eight) Hours As Needed for Nausea or Vomiting., Disp: 12 tablet, Rfl: 0    OXcarbazepine (Trileptal) 150 MG tablet, Take 1 tablet by mouth 2 (Two) Times a Day., Disp: 60 tablet, Rfl: 1    phenazopyridine (Pyridium) 200 MG  tablet, Take 1 tablet by mouth 3 (Three) Times a Day As Needed for Dysuria., Disp: 15 tablet, Rfl: 1    prazosin (Minipress) 5 MG capsule, Take 1 capsule by mouth Every Night., Disp: 30 capsule, Rfl: 1    propranolol (INDERAL) 10 MG tablet, Take 1 tablet by mouth 2 (Two) Times a Day As Needed (panic)., Disp: 60 tablet, Rfl: 1    QUEtiapine XR (SEROquel XR) 400 MG 24 hr tablet, Take 1 tablet by mouth Every Night., Disp: 30 tablet, Rfl: 1    SUMAtriptan (IMITREX) 50 MG tablet, Take 1 tablet by mouth Every 2 (Two) Hours As Needed for Migraine. Take one tablet at onset of headache. May repeat dose one time in 2 hours if headache not relieved., Disp: , Rfl:     vitamin B-12 (CYANOCOBALAMIN) 1000 MCG tablet, Take 1 tablet by mouth Daily., Disp: , Rfl:      Physical Exam  Visit Vitals  LMP 10/31/2023 (Approximate)       Labs  Brief Urine Lab Results  (Last result in the past 365 days)        Color   Clarity   Blood   Leuk Est   Nitrite   Protein   CREAT   Urine HCG        12/03/23 2027               Negative       12/03/23 2027 Yellow   Clear   Small (1+)   Negative   Negative   Negative                   Lab Results   Component Value Date    GLUCOSE 85 12/03/2023    CALCIUM 8.7 12/03/2023     12/03/2023    K 3.9 12/03/2023    CO2 24.5 12/03/2023     12/03/2023    BUN 13 12/03/2023    CREATININE 0.89 12/03/2023    EGFRIFAFRI  12/09/2017      Comment:      Unable to calculate GFR, patient age <=18.    EGFRIFNONA 126 06/07/2021    BCR 14.6 12/03/2023    ANIONGAP 9.5 12/03/2023       Lab Results   Component Value Date    WBC 8.53 12/03/2023    HGB 14.3 12/03/2023    HCT 43.0 12/03/2023    MCV 82.5 12/03/2023     12/03/2023       Radiographic Studies  CT Abdomen Pelvis With Contrast    Result Date: 12/3/2023  Urinary bladder is minimally distended, limiting evaluation, please correlate with urinalysis given the patient's symptomology. No definite evidence of an acute abnormality in the abdomen/pelvis.  Authenticated and Electronically Signed by MD Fischer Richard on 12/03/2023 11:40:23 PM      I have reviewed the above labs and imaging.     Assessment  23 y.o. female with history of endometriosis, concern for recurrent UTI, and urinary retention.    Acharya catheter is removed without complication and the patient passes fill and void immediately.  Her typical cycle of recurrent dysuria episodes concerning for UTI takes place about every 3 to 4 weeks.  I recommend we follow-up with her in about 3 weeks to assess her during a symptomatic episode and begin collecting urine microscopy and cultures to determine her underlying diagnosis.    Plan  1.  Follow-up in 3 weeks with PVR, anticipated UA, possible PCR and micro  2.  Urine PCR obtained today

## 2023-12-11 ENCOUNTER — DOCUMENTATION (OUTPATIENT)
Dept: UROLOGY | Facility: CLINIC | Age: 23
End: 2023-12-11
Payer: COMMERCIAL

## 2023-12-11 NOTE — PROGRESS NOTES
Resolve MDX Urinary PCR was collected on 12/08/2023 and revealed less than 100,000 colony-forming units of E. coli.  There was no growth and this was detected by PCR only.    ESBL resistance gene detected.    Organism is predicted to be sensitive to fosfomycin, Macrobid, fluoroquinolones, Bactrim based on [place] guide data.    She was treated with Levaquin and I suspect this bacteria is residual, dead material.    Results will be scanned into the patient's media tab.

## 2023-12-22 ENCOUNTER — HOSPITAL ENCOUNTER (OUTPATIENT)
Dept: CT IMAGING | Facility: HOSPITAL | Age: 23
Discharge: HOME | End: 2023-12-22
Admitting: OBSTETRICS & GYNECOLOGY
Payer: COMMERCIAL

## 2023-12-22 DIAGNOSIS — Z97.5 IUD (INTRAUTERINE DEVICE) IN PLACE: ICD-10-CM

## 2023-12-22 DIAGNOSIS — R10.32 LEFT LOWER QUADRANT PAIN: ICD-10-CM

## 2023-12-22 DIAGNOSIS — N94.6 DYSMENORRHEA: ICD-10-CM

## 2023-12-22 DIAGNOSIS — R11.0 NAUSEA WITHOUT VOMITING: ICD-10-CM

## 2023-12-22 DIAGNOSIS — N92.1 MENORRHAGIA WITH IRREGULAR CYCLE: ICD-10-CM

## 2023-12-22 PROCEDURE — 74177 CT ABD & PELVIS W/CONTRAST: CPT

## 2023-12-22 PROCEDURE — 25510000001 IOPAMIDOL 61 % SOLUTION: Performed by: OBSTETRICS & GYNECOLOGY

## 2023-12-22 RX ADMIN — IOPAMIDOL 100 ML: 612 INJECTION, SOLUTION INTRAVENOUS at 12:26

## 2024-01-02 ENCOUNTER — OFFICE VISIT (OUTPATIENT)
Dept: UROLOGY | Facility: CLINIC | Age: 24
End: 2024-01-02
Payer: COMMERCIAL

## 2024-01-02 VITALS
HEIGHT: 64 IN | DIASTOLIC BLOOD PRESSURE: 74 MMHG | BODY MASS INDEX: 37.9 KG/M2 | SYSTOLIC BLOOD PRESSURE: 116 MMHG | WEIGHT: 222 LBS

## 2024-01-02 DIAGNOSIS — N30.01 ACUTE CYSTITIS WITH HEMATURIA: Primary | ICD-10-CM

## 2024-01-02 LAB
BILIRUB BLD-MCNC: NEGATIVE MG/DL
CLARITY, POC: CLEAR
COLOR UR: YELLOW
EXPIRATION DATE: ABNORMAL
GLUCOSE UR STRIP-MCNC: NEGATIVE MG/DL
KETONES UR QL: NEGATIVE
LEUKOCYTE EST, POC: NEGATIVE
Lab: ABNORMAL
NITRITE UR-MCNC: NEGATIVE MG/ML
PH UR: 5.5 [PH] (ref 5–8)
PROT UR STRIP-MCNC: ABNORMAL MG/DL
RBC # UR STRIP: ABNORMAL /UL
SP GR UR: 1.03 (ref 1–1.03)
UROBILINOGEN UR QL: NORMAL

## 2024-01-02 PROCEDURE — 81003 URINALYSIS AUTO W/O SCOPE: CPT | Performed by: NURSE PRACTITIONER

## 2024-01-02 PROCEDURE — 99213 OFFICE O/P EST LOW 20 MIN: CPT | Performed by: NURSE PRACTITIONER

## 2024-01-02 PROCEDURE — 51798 US URINE CAPACITY MEASURE: CPT | Performed by: NURSE PRACTITIONER

## 2024-01-02 NOTE — PROGRESS NOTES
"       Office Visit General Established Female Patient     Patient Name: Flavia Mcclure  : 2000   MRN: [id number]     Chief Complaint:   Chief Complaint   Patient presents with    Follow-up     Acute cystitis with hematuria   Retention         Referring Provider: No ref. provider found    History of Present Illness: Flavia Mcclure is a 23 y.o. female with history below in assessment, who presents for follow up.   Has a tear in her colon was put on medication to see if this would heal and she follows up with her Gi on Monday   On menstrual cycle today   She has some mild abdominal pain feels that this is related to GI more so than her bladder  Today she denies UTI symptoms    Subjective      Review of System:   As noted in HPI     Past Medical History:   Past Medical History:   Diagnosis Date    Asthma     Bipolar disorder     Chronic bronchitis     Depression     Elevated cholesterol     Encounter for IUD insertion 2023    KYLEENA    Endometriosis     Female infertility     Frequent UTI     GERD (gastroesophageal reflux disease)     Hypothyroidism     \"born with only 1/2 my thyroid\"     Kidney infection     Migraines     PCOS (polycystic ovarian syndrome)     Polycystic ovary syndrome     PONV (postoperative nausea and vomiting)     Pre-existing type 2 diabetes affecting pregnancy, antepartum     dx age 13    Preeclampsia     PTSD (post-traumatic stress disorder)     Recurrent pregnancy loss, antepartum condition or complication     Schizoaffective disorder        Past Surgical History:   Past Surgical History:   Procedure Laterality Date    ANAL FISTULA REPAIR  2022     SECTION Bilateral 2021    Procedure:  SECTION PRIMARY;  Surgeon: Irineo Freire DO;  Location: [facility] LABOR DELIVERY;  Service: Obstetrics/Gynecology;  Laterality: Bilateral;    CHOLECYSTECTOMY      COLONOSCOPY      DIAGNOSTIC LAPAROSCOPY N/A 2022    Procedure: DIAGNOSTIC LAPAROSCOPY with Lysis of " Adhesions;  Surgeon: Irineo Freire DO;  Location: [facility];  Service: Obstetrics/Gynecology;  Laterality: N/A;    ENDOSCOPY      TONSILLECTOMY         Family History:   Family History   Problem Relation Age of Onset    Asthma Maternal Grandmother     Asthma Paternal Grandmother     Cancer Other     Diabetes Other     Heart attack Other     Hyperlipidemia Other     Hypertension Other     Thyroid disease Other     Migraines Other        Social History:   Social History     Socioeconomic History    Marital status:    Tobacco Use    Smoking status: Former     Types: Cigarettes     Quit date:      Years since quittin.0    Smokeless tobacco: Never   Vaping Use    Vaping Use: Every day    Substances: Nicotine, Flavoring    Devices: Pre-filled or refillable cartridge   Substance and Sexual Activity    Alcohol use: Yes     Comment: occasional    Drug use: Not Currently     Types: Marijuana     Comment: last use     Sexual activity: Defer     Partners: Male     Birth control/protection: None       Medications:     Current Outpatient Medications:     atorvastatin (LIPITOR) 20 MG tablet, Take 1 tablet by mouth Daily., Disp: , Rfl:     celecoxib (CeleBREX) 100 MG capsule, Take 1 capsule by mouth., Disp: , Rfl:     cholecalciferol (VITAMIN D3) 25 MCG (1000 UT) tablet, Take 1 tablet by mouth Daily., Disp: , Rfl:     Continuous Blood Gluc Sensor (Dexcom G6 Sensor), , Disp: , Rfl:     Continuous Blood Gluc Transmit (Dexcom G6 Transmitter) misc, , Disp: , Rfl:     cyclobenzaprine (FLEXERIL) 10 MG tablet, , Disp: , Rfl:     desvenlafaxine (Pristiq) 100 MG 24 hr tablet, Take 1 tablet by mouth Daily., Disp: 30 tablet, Rfl: 1    docusate sodium 100 MG capsule, Take 1 capsule by mouth 2 (Two) Times a Day., Disp: 30 capsule, Rfl: 0    doxepin (SINEquan) 10 MG/ML solution, , Disp: , Rfl:     hydrOXYzine (ATARAX) 50 MG tablet, Take 1 tablet by mouth Daily., Disp: , Rfl:     ibuprofen (ADVIL,MOTRIN) 800 MG  tablet, Take 1 tablet by mouth Every 8 (Eight) Hours As Needed for Mild Pain ., Disp: 40 tablet, Rfl: 0    levonorgestrel (Kyleena) 19.5 MG intrauterine device IUD, 1 each by Intrauterine route 1 (One) Time., Disp: , Rfl:     levothyroxine (SYNTHROID, LEVOTHROID) 100 MCG tablet, Take 1 tablet by mouth Daily., Disp: , Rfl:     loratadine (Claritin) 10 MG tablet, Take 1 tablet by mouth Daily., Disp: , Rfl:     metFORMIN (GLUCOPHAGE) 500 MG tablet, , Disp: , Rfl:     mirtazapine (Remeron) 15 MG tablet, Take 0.5 tablets by mouth At Night As Needed (Sleeping Difficulties)., Disp: 30 tablet, Rfl: 1    norgestimate-ethinyl estradiol (ORTHO-CYCLEN) 0.25-35 MG-MCG per tablet, Take 1 tablet by mouth Daily., Disp: 84 each, Rfl: 3    ondansetron ODT (ZOFRAN-ODT) 4 MG disintegrating tablet, , Disp: , Rfl:     ondansetron ODT (ZOFRAN-ODT) 4 MG disintegrating tablet, Place 1 tablet on the tongue Every 8 (Eight) Hours As Needed for Nausea or Vomiting., Disp: 12 tablet, Rfl: 0    OXcarbazepine (Trileptal) 150 MG tablet, Take 1 tablet by mouth 2 (Two) Times a Day., Disp: 60 tablet, Rfl: 1    phenazopyridine (Pyridium) 200 MG tablet, Take 1 tablet by mouth 3 (Three) Times a Day As Needed for Dysuria., Disp: 15 tablet, Rfl: 1    prazosin (Minipress) 5 MG capsule, Take 1 capsule by mouth Every Night., Disp: 30 capsule, Rfl: 1    propranolol (INDERAL) 10 MG tablet, Take 1 tablet by mouth 2 (Two) Times a Day As Needed (panic)., Disp: 60 tablet, Rfl: 1    QUEtiapine XR (SEROquel XR) 400 MG 24 hr tablet, Take 1 tablet by mouth Every Night., Disp: 30 tablet, Rfl: 1    SUMAtriptan (IMITREX) 50 MG tablet, Take 1 tablet by mouth Every 2 (Two) Hours As Needed for Migraine. Take one tablet at onset of headache. May repeat dose one time in 2 hours if headache not relieved., Disp: , Rfl:     vitamin B-12 (CYANOCOBALAMIN) 1000 MCG tablet, Take 1 tablet by mouth Daily., Disp: , Rfl:     Allergies:   Allergies   Allergen Reactions    Morphine Anxiety  "   Latex Rash    Tylenol [Acetaminophen] Hives and Itching     Reaction was caused by interaction with sleep med.        Objective     Physical Exam:   Vital Signs:   Visit Vitals  /74 (BP Location: Left arm, Patient Position: Sitting, Cuff Size: Adult)   Ht 162.6 cm (64.02\")   Wt 101 kg (222 lb)   BMI 38.09 kg/m²      Body mass index is 38.09 kg/m².     Constitutional: NAD, WDWN.   Neurological: A + O x 3   Psychiatric:  Normal mood and affect    Labs  Brief Urine Lab Results  (Last result in the past 365 days)        Color   Clarity   Blood   Leuk Est   Nitrite   Protein   CREAT   Urine HCG        01/02/24 1010 Yellow   Clear   1+   Negative   Negative   Trace                   Lab Results   Component Value Date    GLUCOSE 85 12/03/2023    CALCIUM 8.7 12/03/2023     12/03/2023    K 3.9 12/03/2023    CO2 24.5 12/03/2023     12/03/2023    BUN 13 12/03/2023    CREATININE 0.89 12/03/2023    EGFRIFAFRI  12/09/2017      Comment:      Unable to calculate GFR, patient age <=18.    EGFRIFNONA 126 06/07/2021    BCR 14.6 12/03/2023    ANIONGAP 9.5 12/03/2023       Lab Results   Component Value Date    WBC 8.53 12/03/2023    HGB 14.3 12/03/2023    HCT 43.0 12/03/2023    MCV 82.5 12/03/2023     12/03/2023        PVR  Post-void residual performed with ultrasound scanner by staff and interpreted by me -35 mL    I have reviewed the above labs and imaging.     Assessment / Plan      Assessment/Plan:   Diagnoses and all orders for this visit:    1. Acute cystitis with hematuria (Primary)  -     POC Urinalysis Dipstick, Automated    23-year-old female with a history of recurrent UTIs and a acute episode of urinary retention here for follow-up.  She has been asymptomatic for UTIs since her last visit, she is taking stool softeners for constipation, she does have pyridium on demand if she develops dysuria.  UA today shows a high specific gravity 1.030, 1+ blood and trace protein.  We discussed urine microscopy " from 12/3/2023 was negative for RBCs patient is on her.  Today 1+ blood is likely from contamination.  Will repeat urine microscopy at next visit for confirmation.  Will have patient follow-up in office with me if she develops UTI symptoms for urine culture, MDX did show ESBL E. coli with no growth.  PVR today is within normal limits at 30 mL.  We discussed constipation can increase patient's risk for urinary retention and recurrent urinary tract infections.    1.  Repeat urine microscopy 3 months  2.  Continue working with GI to improve constipation  3.  Follow-up in office with UTI symptoms for urine culture and guidance UTI  4.  Encouraged increasing fluid intake to 64 ounces mostly water daily    Follow Up:   Return in about 3 months (around 4/2/2024) for Follow up Michael.    SAMSON Mann, NP-C  [facility] [place]

## 2024-03-28 ENCOUNTER — OFFICE VISIT (OUTPATIENT)
Dept: UROLOGY | Facility: CLINIC | Age: 24
End: 2024-03-28
Payer: COMMERCIAL

## 2024-03-28 VITALS
DIASTOLIC BLOOD PRESSURE: 78 MMHG | TEMPERATURE: 98 F | WEIGHT: 222 LBS | BODY MASS INDEX: 37.9 KG/M2 | HEIGHT: 64 IN | SYSTOLIC BLOOD PRESSURE: 118 MMHG

## 2024-03-28 DIAGNOSIS — R31.29 MICROSCOPIC HEMATURIA: ICD-10-CM

## 2024-03-28 DIAGNOSIS — M54.50 BILATERAL LOW BACK PAIN, UNSPECIFIED CHRONICITY, UNSPECIFIED WHETHER SCIATICA PRESENT: Primary | ICD-10-CM

## 2024-03-28 LAB
BILIRUB BLD-MCNC: NEGATIVE MG/DL
CLARITY, POC: CLEAR
COLOR UR: YELLOW
EXPIRATION DATE: ABNORMAL
GLUCOSE UR STRIP-MCNC: NEGATIVE MG/DL
KETONES UR QL: NEGATIVE
LEUKOCYTE EST, POC: ABNORMAL
Lab: ABNORMAL
NITRITE UR-MCNC: NEGATIVE MG/ML
PH UR: 6 [PH] (ref 5–8)
PROT UR STRIP-MCNC: ABNORMAL MG/DL
RBC # UR STRIP: ABNORMAL /UL
SP GR UR: 1.02 (ref 1–1.03)
UROBILINOGEN UR QL: NORMAL

## 2024-03-28 NOTE — PROGRESS NOTES
"       Office Visit General Established Female Patient     Patient Name: Flavia Mcclure  : 2000   MRN: [id number]     Chief Complaint:   Chief Complaint   Patient presents with    Follow-up       Referring Provider: No ref. provider found    History of Present Illness: Flavia Mcclure is a 23 y.o. female with history below in assessment, who presents for follow up.   Patient's PCP wanted her to be seen by urology she was worried her bilateral back pain is related to her kidneys and her bladder pain and wanting clearance from urology prior to ordering MRI for her back  She denies recurrent UTIs, dysuria.  She reports that she does have an occasional stabbing pain in her lower pelvis  Patient reports that she drinks Pepsi daily and maybe 1 bottle of water daily    Subjective      Review of System:   As noted in HPI     Past Medical History:   Past Medical History:   Diagnosis Date    Asthma     Bipolar disorder     Chronic bronchitis     Depression     Elevated cholesterol     Encounter for IUD insertion 2023    KYLEENA    Endometriosis     Female infertility     Frequent UTI     GERD (gastroesophageal reflux disease)     Hypothyroidism     \"born with only 1/2 my thyroid\"     Kidney infection     Migraines     PCOS (polycystic ovarian syndrome)     Polycystic ovary syndrome     PONV (postoperative nausea and vomiting)     Pre-existing type 2 diabetes affecting pregnancy, antepartum     dx age 13    Preeclampsia     PTSD (post-traumatic stress disorder)     Recurrent pregnancy loss, antepartum condition or complication     Schizoaffective disorder        Past Surgical History:   Past Surgical History:   Procedure Laterality Date    ANAL FISTULA REPAIR  2022     SECTION Bilateral 2021    Procedure:  SECTION PRIMARY;  Surgeon: Irineo Freire DO;  Location: [facility] LABOR DELIVERY;  Service: Obstetrics/Gynecology;  Laterality: Bilateral;    CHOLECYSTECTOMY      COLONOSCOPY      " DIAGNOSTIC LAPAROSCOPY N/A 2022    Procedure: DIAGNOSTIC LAPAROSCOPY with Lysis of Adhesions;  Surgeon: Irineo Freire DO;  Location: [facility];  Service: Obstetrics/Gynecology;  Laterality: N/A;    ENDOSCOPY      TONSILLECTOMY         Family History:   Family History   Problem Relation Age of Onset    Asthma Maternal Grandmother     Asthma Paternal Grandmother     Cancer Other     Diabetes Other     Heart attack Other     Hyperlipidemia Other     Hypertension Other     Thyroid disease Other     Migraines Other        Social History:   Social History     Socioeconomic History    Marital status:    Tobacco Use    Smoking status: Former     Current packs/day: 0.00     Types: Cigarettes     Quit date:      Years since quittin.2     Passive exposure: Never    Smokeless tobacco: Never   Vaping Use    Vaping status: Every Day    Substances: Nicotine, Flavoring    Devices: Pre-filled or refillable cartridge   Substance and Sexual Activity    Alcohol use: Yes     Comment: occasional    Drug use: Not Currently     Types: Marijuana     Comment: last use     Sexual activity: Defer     Partners: Male     Birth control/protection: None       Medications:     Current Outpatient Medications:     atorvastatin (LIPITOR) 20 MG tablet, Take 1 tablet by mouth Daily., Disp: , Rfl:     celecoxib (CeleBREX) 100 MG capsule, Take 1 capsule by mouth., Disp: , Rfl:     cholecalciferol (VITAMIN D3) 25 MCG (1000 UT) tablet, Take 1 tablet by mouth Daily., Disp: , Rfl:     Continuous Blood Gluc Sensor (Dexcom G6 Sensor), , Disp: , Rfl:     Continuous Blood Gluc Transmit (Dexcom G6 Transmitter) misc, , Disp: , Rfl:     cyclobenzaprine (FLEXERIL) 10 MG tablet, , Disp: , Rfl:     desvenlafaxine (Pristiq) 100 MG 24 hr tablet, Take 1 tablet by mouth Daily., Disp: 30 tablet, Rfl: 1    docusate sodium 100 MG capsule, Take 1 capsule by mouth 2 (Two) Times a Day., Disp: 30 capsule, Rfl: 0    doxepin (SINEquan) 10 MG/ML  solution, , Disp: , Rfl:     hydrOXYzine (ATARAX) 50 MG tablet, Take 1 tablet by mouth Daily., Disp: , Rfl:     ibuprofen (ADVIL,MOTRIN) 800 MG tablet, Take 1 tablet by mouth Every 8 (Eight) Hours As Needed for Mild Pain ., Disp: 40 tablet, Rfl: 0    levonorgestrel (Kyleena) 19.5 MG intrauterine device IUD, 1 each by Intrauterine route 1 (One) Time., Disp: , Rfl:     levothyroxine (SYNTHROID, LEVOTHROID) 100 MCG tablet, Take 1 tablet by mouth Daily., Disp: , Rfl:     loratadine (Claritin) 10 MG tablet, Take 1 tablet by mouth Daily., Disp: , Rfl:     metFORMIN (GLUCOPHAGE) 500 MG tablet, , Disp: , Rfl:     mirtazapine (Remeron) 15 MG tablet, Take 0.5 tablets by mouth At Night As Needed (Sleeping Difficulties)., Disp: 30 tablet, Rfl: 1    norgestimate-ethinyl estradiol (ORTHO-CYCLEN) 0.25-35 MG-MCG per tablet, Take 1 tablet by mouth Daily., Disp: 84 each, Rfl: 3    ondansetron ODT (ZOFRAN-ODT) 4 MG disintegrating tablet, , Disp: , Rfl:     ondansetron ODT (ZOFRAN-ODT) 4 MG disintegrating tablet, Place 1 tablet on the tongue Every 8 (Eight) Hours As Needed for Nausea or Vomiting., Disp: 12 tablet, Rfl: 0    OXcarbazepine (Trileptal) 150 MG tablet, Take 1 tablet by mouth 2 (Two) Times a Day., Disp: 60 tablet, Rfl: 1    phenazopyridine (Pyridium) 200 MG tablet, Take 1 tablet by mouth 3 (Three) Times a Day As Needed for Dysuria., Disp: 15 tablet, Rfl: 1    prazosin (Minipress) 5 MG capsule, Take 1 capsule by mouth Every Night., Disp: 30 capsule, Rfl: 1    propranolol (INDERAL) 10 MG tablet, Take 1 tablet by mouth 2 (Two) Times a Day As Needed (panic)., Disp: 60 tablet, Rfl: 1    QUEtiapine XR (SEROquel XR) 400 MG 24 hr tablet, Take 1 tablet by mouth Every Night., Disp: 30 tablet, Rfl: 1    SUMAtriptan (IMITREX) 50 MG tablet, Take 1 tablet by mouth Every 2 (Two) Hours As Needed for Migraine. Take one tablet at onset of headache. May repeat dose one time in 2 hours if headache not relieved., Disp: , Rfl:     vitamin B-12  "(CYANOCOBALAMIN) 1000 MCG tablet, Take 1 tablet by mouth Daily., Disp: , Rfl:     Allergies:   Allergies   Allergen Reactions    Morphine Anxiety    Latex Rash    Tylenol [Acetaminophen] Hives and Itching     Reaction was caused by interaction with sleep med.        Objective     Physical Exam:   Vital Signs:   Visit Vitals  /78 (BP Location: Left arm, Patient Position: Sitting, Cuff Size: Adult)   Temp 98 °F (36.7 °C)   Ht 162.6 cm (64.02\")   Wt 101 kg (222 lb)   BMI 38.09 kg/m²      Body mass index is 38.09 kg/m².     Constitutional: NAD, WDWN.   Neurological: A + O x 3   Psychiatric:  Normal mood and affect    Labs  Brief Urine Lab Results  (Last result in the past 365 days)        Color   Clarity   Blood   Leuk Est   Nitrite   Protein   CREAT   Urine HCG        03/28/24 0937 Yellow   Clear   Trace   Trace   Negative   Trace                   Lab Results   Component Value Date    GLUCOSE 85 12/03/2023    CALCIUM 8.7 12/03/2023     12/03/2023    K 3.9 12/03/2023    CO2 24.5 12/03/2023     12/03/2023    BUN 13 12/03/2023    CREATININE 0.89 12/03/2023    EGFRIFAFRI  12/09/2017      Comment:      Unable to calculate GFR, patient age <=18.    EGFRIFNONA 126 06/07/2021    BCR 14.6 12/03/2023    ANIONGAP 9.5 12/03/2023       Lab Results   Component Value Date    WBC 8.53 12/03/2023    HGB 14.3 12/03/2023    HCT 43.0 12/03/2023    MCV 82.5 12/03/2023     12/03/2023            Radiographic Studies  CT Abdomen Pelvis With Contrast    Result Date: 2/19/2024  No acute findings in the abdomen or pelvis to account for the patient's symptoms. Images personally reviewed, interpreted and dictated by CARLI Graves M.D.    CT Abdomen Pelvis With Contrast    Result Date: 12/22/2023  No evidence of bowel obstruction or inflammatory change.   This study was performed with techniques to keep radiation doses as low as reasonably achievable (ALARA). Individualized dose reduction techniques using automated " exposure control or adjustment of vA and/or kV according to the patient size were employed.  This report was signed and finalized on 12/22/2023 3:58 PM by Rodney Brown MD.      CT Abdomen Pelvis With Contrast    Result Date: 12/3/2023  Urinary bladder is minimally distended, limiting evaluation, please correlate with urinalysis given the patient's symptomology. No definite evidence of an acute abnormality in the abdomen/pelvis. Authenticated and Electronically Signed by MD Aaliyah, Mitchell on 12/03/2023 11:40:23 PM      I have reviewed the above labs and imaging.     Assessment / Plan      Assessment/Plan:   Diagnoses and all orders for this visit:    1. Bilateral low back pain, unspecified chronicity, unspecified whether sciatica present (Primary)  -     POC Urinalysis Dipstick, Automated    2. Microscopic hematuria  -     Urinalysis With Microscopic - Urine, Clean Catch    23-year-old female with a history of bilateral low back pain and microscopic hematuria on UA.  We reviewed patient's recent CMP she pulled up on her phone which revealed eGFR greater than 60, normal creatinine, and a mildly elevated BUN/creatinine ratio we discussed kidney function is normal mildly elevated ratio is likely due to patient dehydrated from low fluid volume intake.  Urine microscopy from December showed 0 RBCs, recent microscopy in February in care everywhere shows 0-5 RBCs with high contamination from skin with 6-12 squamous cells.  We discussed confirmation of microscopic hematuria is red blood cells examined under the microscope without skin contamination.  Will continue to monitor this and have patient repeat urine microscopy today.  She has had multiple recent normal CTs with contrast no concerns for kidney stones,hydronephrosis, or cysts.  From a urology standpoint there is no reason patient cannot have MRI for her back pain, and I do not think her pain is related to her kidneys or her bladder.  We discussed lifestyle  modifications to improve her fluid intake today her specific gravity is elevated she has, trace blood, trace leukocytes and trace protein.  These are likely from her high specific gravity and dark urine.  We discussed increasing slowly from 1 bottle of water daily to 4 bottles daily and will repeat urinalysis and likely urine microscopy    Encouraged slowly increasing water intake to 64 ounces daily  Recommend decreasing daily Pepsi intake  Obtain urine microscopy today will consider cystoscopy if confirms blood in urine    Follow Up:   Return in about 3 months (around 6/28/2024) for Follow up SAMSON Davidson, NP-C  [facility] [place]

## 2024-03-29 LAB
APPEARANCE UR: CLEAR
BACTERIA #/AREA URNS HPF: ABNORMAL /HPF
BILIRUB UR QL STRIP: NEGATIVE
CASTS URNS MICRO: ABNORMAL
COLOR UR: YELLOW
EPI CELLS #/AREA URNS HPF: ABNORMAL /HPF
GLUCOSE UR QL STRIP: NEGATIVE
HGB UR QL STRIP: NEGATIVE
KETONES UR QL STRIP: NEGATIVE
LEUKOCYTE ESTERASE UR QL STRIP: ABNORMAL
MUCOUS THREADS URNS QL MICRO: ABNORMAL /HPF
NITRITE UR QL STRIP: NEGATIVE
PH UR STRIP: 6 [PH] (ref 5–8)
PROT UR QL STRIP: ABNORMAL
RBC #/AREA URNS HPF: ABNORMAL /HPF
SP GR UR STRIP: 1.02 (ref 1–1.03)
UROBILINOGEN UR STRIP-MCNC: ABNORMAL MG/DL
WBC #/AREA URNS HPF: ABNORMAL /HPF

## 2024-09-06 ENCOUNTER — OFFICE VISIT (OUTPATIENT)
Dept: OBSTETRICS AND GYNECOLOGY | Facility: CLINIC | Age: 24
End: 2024-09-06
Payer: COMMERCIAL

## 2024-09-06 VITALS
BODY MASS INDEX: 40.12 KG/M2 | WEIGHT: 235 LBS | DIASTOLIC BLOOD PRESSURE: 70 MMHG | HEIGHT: 64 IN | SYSTOLIC BLOOD PRESSURE: 120 MMHG

## 2024-09-06 DIAGNOSIS — Z31.69 ENCOUNTER FOR PRECONCEPTION CONSULTATION: ICD-10-CM

## 2024-09-06 DIAGNOSIS — Z30.432 ENCOUNTER FOR IUD REMOVAL: Primary | ICD-10-CM

## 2024-09-06 DIAGNOSIS — Z87.42 HISTORY OF INFERTILITY: ICD-10-CM

## 2024-09-06 RX ORDER — METHOCARBAMOL 500 MG/1
500 TABLET, FILM COATED ORAL
COMMUNITY
Start: 2024-04-13

## 2024-09-06 RX ORDER — TEMAZEPAM 30 MG
30 CAPSULE ORAL
COMMUNITY

## 2024-09-06 RX ORDER — PNV NO.95/FERROUS FUM/FOLIC AC 28MG-0.8MG
1 TABLET ORAL DAILY
Qty: 90 TABLET | Refills: 3 | Status: SHIPPED | OUTPATIENT
Start: 2024-09-06

## 2024-09-06 NOTE — PROGRESS NOTES
"Chief Complaint  Procedure (Removal of Kyleena IUD, patient wants to discuss getting pregnant. )     History of Present Illness:  Patient is 24 y.o.  who presents to [facility] here for removal of her IUD.  Patient is desiring pregnancy.  She does have a history of infertility.  She previously tried for 2 years.  She did multiple rounds of Clomid as well as artificial insemination.  She was getting ready to do in vitro fertilization but became pregnant.  She reports with her Kyleena IUD she has had scant irregular bleeding.  She denies any major changes in her health or medications.  She sees Dr. Cassidy for  her primary care.  She has been taking prenatal vitamins.  She had her last Pap smear approximately 2 years ago.  The patient did have recent labs through the emergency room.  She was seen by cardiology as well.    History  Past Medical History:   Diagnosis Date    Asthma     Bipolar disorder     Chronic bronchitis     Depression     Elevated cholesterol     Encounter for IUD insertion 2023    KYLEENA    Endometriosis     Female infertility     Frequent UTI     GERD (gastroesophageal reflux disease)     Hypothyroidism     \"born with only 1/2 my thyroid\"     Kidney infection     Migraines     PCOS (polycystic ovarian syndrome)     Polycystic ovary syndrome     PONV (postoperative nausea and vomiting)     Pre-existing type 2 diabetes affecting pregnancy, antepartum     dx age 13    Preeclampsia     PTSD (post-traumatic stress disorder)     Recurrent pregnancy loss, antepartum condition or complication     Schizoaffective disorder      Current Outpatient Medications on File Prior to Visit   Medication Sig Dispense Refill    methocarbamol (ROBAXIN) 500 MG tablet Take 1 tablet by mouth.      atorvastatin (LIPITOR) 20 MG tablet Take 1 tablet by mouth Daily.      celecoxib (CeleBREX) 100 MG capsule Take 1 capsule by mouth.      cholecalciferol (VITAMIN D3) 25 MCG (1000 UT) tablet " Take 1 tablet by mouth Daily.      Continuous Blood Gluc Sensor (Dexcom G6 Sensor)       Continuous Blood Gluc Transmit (Dexcom G6 Transmitter) misc       cyclobenzaprine (FLEXERIL) 10 MG tablet       desvenlafaxine (Pristiq) 100 MG 24 hr tablet Take 1 tablet by mouth Daily. 30 tablet 1    docusate sodium 100 MG capsule Take 1 capsule by mouth 2 (Two) Times a Day. 30 capsule 0    doxepin (SINEquan) 10 MG/ML solution       hydrOXYzine (ATARAX) 50 MG tablet Take 1 tablet by mouth Daily.      ibuprofen (ADVIL,MOTRIN) 800 MG tablet Take 1 tablet by mouth Every 8 (Eight) Hours As Needed for Mild Pain . 40 tablet 0    levonorgestrel (Kyleena) 19.5 MG intrauterine device IUD 1 each by Intrauterine route 1 (One) Time.      levothyroxine (SYNTHROID, LEVOTHROID) 100 MCG tablet Take 1 tablet by mouth Daily.      loratadine (Claritin) 10 MG tablet Take 1 tablet by mouth Daily.      metFORMIN (GLUCOPHAGE) 500 MG tablet       mirtazapine (Remeron) 15 MG tablet Take 0.5 tablets by mouth At Night As Needed (Sleeping Difficulties). 30 tablet 1    norgestimate-ethinyl estradiol (ORTHO-CYCLEN) 0.25-35 MG-MCG per tablet Take 1 tablet by mouth Daily. 84 each 3    ondansetron ODT (ZOFRAN-ODT) 4 MG disintegrating tablet       ondansetron ODT (ZOFRAN-ODT) 4 MG disintegrating tablet Place 1 tablet on the tongue Every 8 (Eight) Hours As Needed for Nausea or Vomiting. 12 tablet 0    OXcarbazepine (Trileptal) 150 MG tablet Take 1 tablet by mouth 2 (Two) Times a Day. 60 tablet 1    phenazopyridine (Pyridium) 200 MG tablet Take 1 tablet by mouth 3 (Three) Times a Day As Needed for Dysuria. 15 tablet 1    prazosin (Minipress) 5 MG capsule Take 1 capsule by mouth Every Night. 30 capsule 1    propranolol (INDERAL) 10 MG tablet Take 1 tablet by mouth 2 (Two) Times a Day As Needed (panic). 60 tablet 1    QUEtiapine XR (SEROquel XR) 400 MG 24 hr tablet Take 1 tablet by mouth Every Night. 30 tablet 1    SUMAtriptan (IMITREX) 50 MG tablet Take 1 tablet  by mouth Every 2 (Two) Hours As Needed for Migraine. Take one tablet at onset of headache. May repeat dose one time in 2 hours if headache not relieved.      temazepam (RESTORIL) 30 MG capsule Take 1 capsule by mouth.      vitamin B-12 (CYANOCOBALAMIN) 1000 MCG tablet Take 1 tablet by mouth Daily.       No current facility-administered medications on file prior to visit.     Allergies   Allergen Reactions    Morphine Anxiety    Latex Rash    Milk (Cow) Unknown (See Comments)    Tylenol [Acetaminophen] Hives and Itching     Reaction was caused by interaction with sleep med.      Past Surgical History:   Procedure Laterality Date    ANAL FISTULA REPAIR  2022     SECTION Bilateral 2021    Procedure:  SECTION PRIMARY;  Surgeon: Irineo Freire DO;  Location: [facility] LABOR DELIVERY;  Service: Obstetrics/Gynecology;  Laterality: Bilateral;    CHOLECYSTECTOMY      COLONOSCOPY      DIAGNOSTIC LAPAROSCOPY N/A 2022    Procedure: DIAGNOSTIC LAPAROSCOPY with Lysis of Adhesions;  Surgeon: Irineo Freire DO;  Location: [facility] OR;  Service: Obstetrics/Gynecology;  Laterality: N/A;    ENDOSCOPY      TONSILLECTOMY       Family History   Problem Relation Age of Onset    Asthma Maternal Grandmother     Asthma Paternal Grandmother     Cancer Other     Diabetes Other     Heart attack Other     Hyperlipidemia Other     Hypertension Other     Thyroid disease Other     Migraines Other      Social History     Socioeconomic History    Marital status:    Tobacco Use    Smoking status: Former     Current packs/day: 0.00     Types: Cigarettes     Quit date:      Years since quittin.6     Passive exposure: Never    Smokeless tobacco: Never   Vaping Use    Vaping status: Every Day    Substances: Nicotine, Flavoring    Devices: Pre-filled or refillable cartridge   Substance and Sexual Activity    Alcohol use: Yes     Comment: occasional    Drug use: Not Currently     Types: Marijuana     " Comment: last use 2018    Sexual activity: Defer     Partners: Male     Birth control/protection: None       Physical Examination:  Vital Signs: /70   Ht 162.6 cm (64\")   Wt 107 kg (235 lb)   BMI 40.34 kg/m²     General Appearance: alert, appears stated age, and cooperative  Breasts: Not performed  Abdomen: no masses, no hepatomegaly, no splenomegaly, soft non-tender, no guarding, and no rebound tenderness  Pelvic: Clinical staff was present for exam; pelvic examination was required for evaluation  External genitalia:  normal appearance of the external genitalia including Bartholin's and Wood's glands.  :  urethral meatus normal;  Vaginal:  normal pink mucosa without prolapse or lesions.  IUD strings noted  Cervix:  normal appearance.  Uterus:  normal size, shape and consistency.  Adnexa:  normal bimanual exam of the adnexa.      IUD Removal    Date of procedure:  9/6/2024    Risks and benefits discussed? yes  All questions answered? yes  Consents given by The patient  Written consent obtained? yes  Reason for removal: Desires pregnancy    Local anesthesia used:  no    Procedure documentation:    A speculum was placed in order to view the cervix.  A tenaculum did not need to be placed on the anterior cervical lip.  Cervical dilation did not need to be performed in order to access the string.  The IUD string was easily seen.  The string was grasped and the IUD was removed without difficulty.  The IUD did not appear to be adherent to the uterine cavity. It was removed intact.    She tolerated the procedure without any difficulty.     Post procedure instructions: Patient notified to call with heavy bleeding, fever or increasing pain.       Data Review:  The following data was reviewed by: Kandis Forrester MD on 09/06/2024:     Labs:  TSH (06/16/2024 20:03)  SST Top (06/16/2024 20:03)  Red Top Extra Tubes (06/16/2024 20:03)  High Sensitivity Troponin I (06/16/2024 20:03)  D-dimer (06/16/2024 " 20:03)  COMPREHENSIVE METABOLIC PANEL (2024 20:03)  CBC with Automated Diff (2024 20:03)  Glucose, Nova Meter (2024 22:13)  Imaging:    Medical Records:  Blanca Randolph PA-C  Physician Assistant  Emergency Medicine     ED Provider Notes     Signed     Date of Service: 24  Creation Time: 24  Note Received: 24     Signed       -------------------------------------------------------------------------------  Summary: syncope  -------------------------------------------------------------------------------     Subjective   Chief Complaint: Loss of Consciousness and Chest Pain        BHANU Alejandro is a 24 year old diabetic presents emergency room with two syncopal events today.  She states that she can feel when these events are coming on and her vision will go black and she will pass out.  She was seen in our emergency room 2 days ago for similar episode.  She was diagnosed with paroxysmal SVT after wearing a Holter monitor.  She has a cardiology appointment tomorrow. She was placed on oxygen by her pulmonologist who sees her for sleep apnea.  She is a non-insulin-dependent diabetic.  She states that she has not eaten today but she checked her blood glucose and it was 126 after her syncopal event.  She denies any chest pain prior to passing out.      Patient History        Past Medical History:   Diagnosis Date    Cardiomyopathy (HCC) 2024    Diabetes mellitus (HCC)      Sleep apnea 2024    Suicide attempt (HCC)                 Past Surgical History:   Procedure Laterality Date    ADNOIDS         SECTION, CLASSIC        CHOLECYSTECTOMY        COLONOSCOPY,BIOPSY N/A 2022     Procedure: COLONOSCOPY, WITH BIOPSY;  Surgeon: Elvin Fleming MD;  [facility]: [facility];  Service: Gastroenterology;  Laterality: N/A;    TONSILLECTOMY                   Family History   Problem Relation Age of Onset    Asthma Other      Diabetes Other      Stroke Other       Hyperlipidemia Other      Hypertension Other      Thyroid cancer Other           Social History            Tobacco Use    Smoking status: Former       Types: Cigarettes    Smokeless tobacco: Never    Tobacco comments:       Vape per patient-last this am.   Substance Use Topics    Alcohol use: Never         I reviewed the HPI, ROS and PFSH documentation recorded by others in the medical record and supplemented my note as needed.     Review of Systems   Review of Systems   All other systems reviewed and are negative.        Physical Exam       ED Triage Vitals   Enc Vitals Group      BP        Pulse        Resp        Temp        Temp src        SpO2        Weight        Height        Head Circumference        Peak Flow        Pain Score        Pain Loc        Pain Edu?        Excl. in GC?        Physical Exam  Vitals and nursing note reviewed.   Constitutional:       General: She is not in acute distress.     Appearance: Normal appearance.   HENT:      Head: Normocephalic and atraumatic.      Mouth/Throat:      Mouth: Mucous membranes are moist.      Pharynx: Oropharynx is clear.   Cardiovascular:      Rate and Rhythm: Normal rate.   Pulmonary:      Effort: Pulmonary effort is normal.      Breath sounds: Normal breath sounds.   Abdominal:      General: Bowel sounds are normal.      Palpations: Abdomen is soft.      Tenderness: There is no abdominal tenderness.   Musculoskeletal:         General: Normal range of motion.      Cervical back: Normal range of motion and neck supple.   Skin:     General: Skin is warm and dry.   Neurological:      General: No focal deficit present.      Mental Status: She is alert and oriented to person, place, and time. Mental status is at baseline.   Psychiatric:         Mood and Affect: Mood normal.         Thought Content: Thought content normal.         Neurologic Exam      Mental Status   Oriented to person, place, and time.      Ortho Exam     ED Course & MDM   Medications   sodium  chloride 0.9% (NS) bolus (0 mLs Intravenous Stopped 6/16/24 2100)   iopamidoL (ISOVUE-370) 370 mg iodine /mL (76 %) injection 75 mL (75 mLs Intravenous Given 6/16/24 3794)            Results for orders placed or performed during the hospital encounter of 06/16/24   CBC with Automated Diff   Result Value Ref Range     WBC 8.9 4.0 - 10.0 K/µL     RBC 5.27 (H) 3.93 - 5.22 M/µL     Hemoglobin 14.5 11.2 - 15.7 GM/DL     Hematocrit 44.3 34.1 - 44.9 %     MCV 84 79 - 95 fL     MCH 27.5 25.6 - 32.2 pg     MCHC 32.7 32.2 - 35.5 GM/DL     RDW 12.9 11.7 - 14.4 %     Platelets 311 140 - 375 K/CU MM     MPV 9.8 9.4 - 12.3 fL     Nucleated Red Blood Cell 0.0 0 - 0.2 %     % Neutros 44 34 - 71 %     % Lymphs 46 19 - 52 %     % Monos 6 5 - 13 %     % Eos 3 1 - 6 %     % Baso 1 0 - 1 %     NRBC Absolute  <0.01 0 - 0.012 K/ul     # Neutros 3.90 1.56 - 6.13 K/µL     # Lymphs 4.03 (H) 1.18 - 3.74 K/µL     # Monos 0.56 0.24 - 0.86 K/µL     # Eos 0.28 0.04 - 0.36 K/µL     # Baso 0.05 0.01 - 0.08 K/µL     Immature Granulocytes-Relative 0.30 0.01 - 0.43 %     # IG 0.03 0.00 - 0.03 K/uL   Comprehensive metabolic panel   Result Value Ref Range     Sodium 138 135 - 145 meq/L     Potassium 4.4 3.5 - 5.3 meq/L     Chloride 108 101 - 111 meq/L     CO2 25 21 - 31 meq/L     Calcium 9.1 8.5 - 10.5 mg/dL     Glucose 86 70 - 109 mg/dL     BUN 13 6 - 20 mg/dL     Creatinine 1.08 0.50 - 1.20 mg/dL     BUN/Creatinine 12       Albumin 3.8 3.2 - 5.5 g/dL     Alkaline Phosphatase 131 (H) 42 - 115 U/L     ALT 26 5 - 50 U/L     AST 24 10 - 42 U/L     Total Bilirubin 0.4 0.0 - 1.0 mg/dL     Protein, Total 7.8 6.7 - 8.2 gm/dL     Anion Gap 9 8 - 16     A/G Ratio 1.0       Globulin 4 g/dL     Osmolality Calc 275.1       eGFR (mL/min/1.73m2) >60 >=60 mL/min/1.73m2   D-dimer   Result Value Ref Range     D-Dimer, Quant 0.52 (H) 0.19 - 0.50 MG/L FEU   High Sensitivity Troponin I   Result Value Ref Range     Troponin I High Sensitivity (pg/mL) <3.0 (L) 3 - 58.8  pg/mL   Red Top Extra Tubes   Result Value Ref Range     HOLD SPECIMEN (SJ - BKR) Hold for add-ons.     SST Top   Result Value Ref Range     HOLD SPECIMEN (SJ - BKR) Hold for add-ons.     TSH   Result Value Ref Range     TSH (miU/mL) 3.600 0.400 - 4.700 miU/mL   Glucose, Nova Meter   Result Value Ref Range     POC-GLUCOSE 75 70 - 110 mg/dL      61157        CT chest for pulmonary embolus   Final Result   No evidence of pulmonary embolism.        No acute findings in the chest.                   Images reviewed, interpreted, and dictated by MARYANN Fischer M.D.                           ED Course as of 06/17/24 0539   Sun Jun 16, 2024 2209 CT of chest:     IMPRESSION:   No evidence of pulmonary embolism.      No acute findings in the chest. [MM]       ED Course User Index  [MM] Blanca Randolph PA-C            Procedures  Medical Decision Making  24-year-old female seen and evaluated for complaint. Labs and imaging viewed. Labs WNL, except for mildly elevated d-dimer which prompted a CT chest. CT revealed IMPRESSION: No evidence of pulmonary embolism. No acute findings in the chest. EKG showed normal sinus rhythm, and normal EKG. I shared results with patient at bedside. Told her to go to scheduled cardiology appointment at  tomorrow for further evaluation. Follow up with your primary care provider for re-evaluation. Avoid driving or operating heavy machinery while having syncopal events. If symptoms worsen or you develop new concerning symptoms return to ER. Patient verbalized understanding and agreed to plan of care.        Amount and/or Complexity of Data Reviewed  Labs: ordered.  Radiology: ordered.                   Assessment & Plan            Clinical Impression       Diagnosis Comment Added By Time Added     Syncope, unspecified syncope type   Blnaca Randolph PA-C 6/16/2024 10:11 PM                Disposition  Discharge [1] - 6/16/2024 10:09 PM     Discharge Medication List as of 6/16/2024 10:19 PM      "             Contact information for follow-up      [facility] Fitzpatrick   Specialty: Family Medicine   Relationship: PCP - General    [address] [place] [address]   Phone: [phone]        Next Steps: Schedule an appointment as soon as possible for a visit                \"Electronically Signed By\"     Blanca Randolph PA-C  06/17/24 0539               Electronically signed by Fabián Narayan MD at 06/18/24 0645     SAMSON Sweet  Nurse Practitioner  Cardiology     Progress Notes     Signed     Encounter Date: 6/17/2024  Note Received: 09/06/24 1654     Signed            Cardiology Clinic Note              Date of Visit  06/17/24     Patient  Flavia Mcclure  [address]  [place] [address] [phone]     Referring Provider  No ref. provider found  PCP  Paola Gutiérrez APRN        SUBJECTIVE  History of Present Illness  Today Dr. Anguiano and I saw Flavia Mcclure, a 24 y.o. female at [facility] at [facility] for consultation of syncope at the request of Paola Gutiérrez. Patient has been passing out for a week. She has passed out 4 times in one week. She is out for seconds to three minutes. She feels like her body is slowly going to sleep, she feels weak and tired. She is not doing anything in particular when those episodes happen, other than her bp getting low. 80/60. Her heart starts racing, her hands getting sweaty, she gets tunnel vision. She had a Holter monitor that she wore in May 2024 for chest pain and low oxygen at night. She gets chest pain that she describes a squeezing. She has chest pain at rest. She denies getting SOB going up a flight of stairs. Her oxygen was low. She had a sleep study done and her oxygen get low at night, she is on oxygen at night for the last 2 weeks. She has a small blood clot in her lung. She is going to start on lovenox for the PE. No swelling. She has palpitations, her heart races, this has been going on for one year. It " happens 4x week, the episodes last about 1-2 minutes. It makes her anxious. Had pre-eclampsia with a pregnancy, 2.5 years ago. She was delivered at 36 weeks.  PMH- hyperlipidemia- on medicine, hypothyroidism- on medicine, bipolar disorder, diabetes, anxiety, GERRY, obesity,      Cardiac testing-  Echo on 5/24- LVEF- 50-55%. LV function is normal. RV is normal. LA and RA is normal. Otherwise normal echo.   Holter monitor- 5/17/24- Monitor- HR- 66 bpm to 163 bpm. SVT for 4 beats.       Problem List      Patient Active Problem List   Diagnosis    Chronic posterior anal fissure    Severe obesity (BMI 35.0-39.9) with comorbidity (CMS/HCC)    Abdominal cramping affecting pregnancy    Other specified complication of genitourinary prosthetic devices, implants and grafts, initial encounter (CMS/HCC)    Abnormal uterine and vaginal bleeding, unspecified    Acute cystitis with hematuria    Allergic rhinitis    Anxiety    Asthma    Breech presentation    Depressive disorder    Diabetes (CMS/HCC)    Type 1 diabetes mellitus (CMS/HCC)    Dyshidrosis (pompholyx)    Excessive and frequent menstruation with regular cycle    Hemorrhage of anus and rectum    Hyperlipidemia    Hypocalcemia    Hypothyroidism    IgA deficiency (CMS/HCC)    Irregular menstruation, unspecified    IUGR (intrauterine growth restriction) affecting care of mother, third trimester, fetus 1    Lactose intolerance    Left lower quadrant pain    Migraine headache    Other microscopic hematuria    Other specified diseases of anus and rectum    Pelvic and perineal pain    Postcholecystectomy syndrome    Primary IgA nephropathy    Retention of urine, unspecified    Sprain of unspecified ligament of right ankle, initial encounter    Type 2 diabetes mellitus affecting pregnancy, antepartum    Urinary tract infection, site not specified    Vitamin D deficiency    Zoster without complications    Anal fissure, unspecified    Chronic anal fissure    Morbid (severe) obesity  "due to excess calories (CMS/[facility])         Past Medical History        Past Medical History:   Diagnosis Date    Anxiety 2024     panic attacks    Asthma 2024     as a child, no longer uses inhaler    Delayed emergence from general anesthesia      Depression      Diabetes mellitus (CMS/HCC) 2024     Recent BG in the 80\"s    Hyperlipidemia      Hypothyroidism      Low back pain      PONV (postoperative nausea and vomiting)      PTSD (post-traumatic stress disorder)      Syncope 2024     due to low blood sugar         Past Surgical History        Past Surgical History:   Procedure Laterality Date     SECTION, CLASSIC        CHOLECYSTECTOMY N/A       Cholecystectomy from [facility]    TONSILLECTOMY N/A       Tonsillectomy from [facility]    WISDOM TOOTH EXTRACTION             Family History         Family History   Problem Relation Name Age of Onset    Malig Hyperthermia Neg Hx             Social History  Social History            Tobacco Use    Smoking status: Never       Passive exposure: Never    Smokeless tobacco: Never   Vaping Use    Vaping status: Every Day    Substances: Nicotine    Devices: Disposable   Substance Use Topics    Alcohol use: Yes       Comment: average 1 glass a month    Drug use: Not Currently       Types: Marijuana         Current Medications     Current Outpatient Medications:     atorvastatin (Lipitor) 20 MG tablet, every night., Disp: , Rfl:     cyclobenzaprine (Flexeril) 10 MG tablet, Take 1 tablet (10 mg) by mouth 3 (three) times a day if needed for muscle spasms., Disp: , Rfl:     desvenlafaxine (Pristiq) 100 MG 24 hr tablet, Take 1 tablet (100 mg) by mouth 1 (one) time each day in the morning., Disp: , Rfl:     levonorgestrel (Kyleena) 19.5 MG intrauterine device IUD, 1 each by Intrauterine route 1 (one) time if needed., Disp: , Rfl:     levothyroxine (Synthroid, Levoxyl) 100 MCG tablet, 1 (one) time each day before breakfast., Disp: , Rfl:     loratadine (Claritin) 10 " "MG tablet, Take 1 tablet (10 mg) by mouth every night., Disp: , Rfl:     methocarbamol (Robaxin) 500 MG tablet, Take 1 tablet (500 mg) by mouth 4 (four) times a day if needed for muscle spasms for up to 10 days., Disp: 40 tablet, Rfl: 0    prazosin (Minipress) 5 MG capsule, Take 1 capsule (5 mg) by mouth every night., Disp: , Rfl:     propranolol (Inderal) 10 MG tablet, Take 2 tablets (20 mg) by mouth 2 (two) times a day., Disp: , Rfl:     QUEtiapine XR (SEROquel XR) 300 MG 24 hr tablet, Take 1 tablet (300 mg) by mouth every night., Disp: , Rfl:     SUMAtriptan (Imitrex) 50 MG tablet, Take 1 tablet (50 mg) by mouth 1 (one) time if needed., Disp: , Rfl:     temazepam (Restoril) 30 MG capsule, Take 1 capsule (30 mg) by mouth every night., Disp: , Rfl:     triamcinolone (Kenalog) 0.1 % ointment, Apply 1 Application topically. 2 x week, Disp: , Rfl:     ondansetron ODT (Zofran-ODT) 4 MG disintegrating tablet, Place 1 tablet (4 mg) under the tongue every 8 (eight) hours if needed. (Patient not taking: Reported on 6/17/2024), Disp: , Rfl:       Allergies       Allergies   Allergen Reactions    Latex Hives, Itching, Rash and Swelling    Milk (Cow) Unknown - Patient states they do not know rxn details    Morphine Rash    Tylenol [Acetaminophen] Rash          Review of Systems  14 point ROS negative except as listed in HPI.      OBJECTIVE  Vitals  Visit Vitals  /74 (BP Location: Right arm, Patient Position: Sitting, BP Cuff Size: Adult)   Pulse 80   Ht 1.626 m (5' 4\")   Wt 106 kg (234 lb 2.1 oz)   SpO2 98%   BMI 40.19 kg/m²         Physical Exam  Physical Exam  Vitals reviewed.   Constitutional:       Appearance: Normal appearance. She is obese.   HENT:      Head: Normocephalic and atraumatic.      Nose: Nose normal.   Eyes:      Extraocular Movements: Extraocular movements intact.      Conjunctiva/sclera: Conjunctivae normal.   Cardiovascular:      Rate and Rhythm: Normal rate and regular rhythm.      Pulses: Normal " "pulses.      Heart sounds: Normal heart sounds.   Pulmonary:      Effort: Pulmonary effort is normal.      Breath sounds: Normal breath sounds.   Musculoskeletal:         General: Normal range of motion.      Cervical back: Normal range of motion and neck supple.   Skin:     General: Skin is warm and dry.      Capillary Refill: Capillary refill takes less than 2 seconds.   Neurological:      General: No focal deficit present.      Mental Status: She is alert and oriented to person, place, and time.   Psychiatric:         Mood and Affect: Mood normal.         Behavior: Behavior normal.         Thought Content: Thought content normal.         Judgment: Judgment normal.            Diagnostics  No echocardiogram results found for the past 12 months     Lab Review        Lab Results   Component Value Date/Time     WBC 8.53 12/03/2023 2008     RBC 5.21 12/03/2023 2008     HGB 14.3 12/03/2023 2008     HCT 43.0 12/03/2023 2008             Lab Results   Component Value Date/Time     GLUCOSE 119 (H) 09/09/2021 1416      No results found for: \"AST\", \"ALT\", \"ALKPHOS\"   No results found for: \"CHOL\", \"LDL\", \"LDLCALC\", \"HDL\", \"TRIG\"   No results found for: \"HGBA1C\"   No results found for: \"TSH\", \"FREET4\"          ASSESSMENT AND PLAN     Visit Diagnoses and Orders  1. GERRY (obstructive sleep apnea)  Wearing oxygen at night and   2. Severe obesity (BMI 35.0-39.9) with comorbidity (CMS/HCC)  Complicates all aspects of care.      3. Vasovagal syncope   Patient taking prazosin 7 mg. (On for 4 months) Daily and propanolol. Dr. Moreland concerned that prazosin and propanolol ( for more than a year) are causing low bp.   We recommend tapering patient off of prazosin first, as it can cause low blood pressure and may be contributing to her syncopal episodes. Propanolol and prazosin together can further keep bp low and cause syncope. Would reassess symptoms after decreasing medications. Would consider tilt table.   FOLLOW UP:           A total " time of 35 minutes was spent by MD and TERENCE addressing the current illness, reviewing records (prior imaging, lab work, etc), and formulating a plan. The patient is agreeable to the plan and all pertinent questions were answered. The patient's cardiac evaluation was discussed with Dr. Moreland who agrees with the plan.     SAMSON Fitzpatrick thank you for the consultation. Please do not hesitate to contact us with any questions.     SAMSON Sweet                  Electronically signed by SAMSON Sweet at 06/20/24 0716      Consult on 6/17/2024 Note shared with patient in the original system    Received From:  [facility] [employer]     Assessment and Plan   1. Encounter for IUD removal  Duty removed without difficulty.  Instructions and precautions have been given.  I have discussed with the patient at the return of normal menstrual cycles.  Patient is to follow-up if no menses within the next 4 to 6 weeks.  She is also to follow-up if her menstrual cycles are not regular in 3 to 4 months.    2. Encounter for preconception consultation  Patient is in the process of being weaned off of her prazosin.  She is to follow-up with her primary care provider.  Recommend tight control of her glucose levels for conception.  Patient is to continue her prenatal vitamins with folic acid.    3. History of infertility  Patient with history of infertility.  Patient is instructed if regular menstrual cycles and no conception within 6 months to follow-up as discussed.    Follow Up/Instructions:  Follow up as noted.  Patient was given instructions and counseling regarding her condition or for health maintenance advice. Please see specific information pulled into the AVS if appropriate.     Note: Speech recognition transcription software may have been used to dictate portions of this document.  An attempt at proofreading has been made though minor errors in transcription may still be present.    This note was  electronically signed.  Kandis Forrester M.D.

## 2024-10-21 ENCOUNTER — TELEPHONE (OUTPATIENT)
Dept: OBSTETRICS AND GYNECOLOGY | Facility: CLINIC | Age: 24
End: 2024-10-21
Payer: COMMERCIAL

## 2024-10-21 NOTE — TELEPHONE ENCOUNTER
Caller: Flavia Mcclure    Relationship: Self    Best call back number: [phone]    What is the best time to reach you:     ASAP    Who are you requesting to speak with (clinical staff, provider,  specific staff member):     DR PARKER OR NURSE    What was the call regarding:     PT RECENTLY HAD IUD REMOVED   SHE HAS GOTTEN HER FIRST PERIOD SINCE THEN    SHE IS EXPERIENCING HEAVY BLEEDING AND SEVERE CRAMPING    PT STATE SHE IS GOING THROUGH 2 PADS AN HOUR    PT ADVISED TO GO TO ED

## 2024-11-13 ENCOUNTER — HOSPITAL ENCOUNTER (EMERGENCY)
Facility: HOSPITAL | Age: 24
Discharge: PSYCHIATRIC OR SUBSTANCE USE FACILITY | DRG: 885 | End: 2024-11-13
Attending: EMERGENCY MEDICINE | Admitting: EMERGENCY MEDICINE
Payer: COMMERCIAL

## 2024-11-13 ENCOUNTER — HOSPITAL ENCOUNTER (INPATIENT)
Facility: HOSPITAL | Age: 24
LOS: 1 days | Discharge: HOME | DRG: 885 | End: 2024-11-14
Attending: PSYCHIATRY & NEUROLOGY | Admitting: PSYCHIATRY & NEUROLOGY
Payer: COMMERCIAL

## 2024-11-13 VITALS
HEIGHT: 64 IN | SYSTOLIC BLOOD PRESSURE: 143 MMHG | HEART RATE: 103 BPM | WEIGHT: 232 LBS | TEMPERATURE: 98.4 F | BODY MASS INDEX: 39.61 KG/M2 | OXYGEN SATURATION: 96 % | RESPIRATION RATE: 18 BRPM | DIASTOLIC BLOOD PRESSURE: 98 MMHG

## 2024-11-13 DIAGNOSIS — R45.851 DEPRESSION WITH SUICIDAL IDEATION: Primary | ICD-10-CM

## 2024-11-13 DIAGNOSIS — F32.A DEPRESSION WITH SUICIDAL IDEATION: Primary | ICD-10-CM

## 2024-11-13 LAB
ALBUMIN SERPL-MCNC: 4.1 G/DL (ref 3.5–5.2)
ALBUMIN/GLOB SERPL: 1.3 G/DL
ALP SERPL-CCNC: 99 U/L (ref 39–117)
ALT SERPL W P-5'-P-CCNC: 53 U/L (ref 1–33)
AMPHET+METHAMPHET UR QL: NEGATIVE
AMPHETAMINES UR QL: NEGATIVE
ANION GAP SERPL CALCULATED.3IONS-SCNC: 10.6 MMOL/L (ref 5–15)
APAP SERPL-MCNC: <5 MCG/ML (ref 0–30)
AST SERPL-CCNC: 34 U/L (ref 1–32)
B-HCG UR QL: NEGATIVE
BACTERIA UR QL AUTO: ABNORMAL /HPF
BARBITURATES UR QL SCN: NEGATIVE
BASOPHILS # BLD AUTO: 0.05 10*3/MM3 (ref 0–0.2)
BASOPHILS NFR BLD AUTO: 0.6 % (ref 0–1.5)
BENZODIAZ UR QL SCN: POSITIVE
BILIRUB SERPL-MCNC: 0.3 MG/DL (ref 0–1.2)
BILIRUB UR QL STRIP: NEGATIVE
BUN SERPL-MCNC: 7 MG/DL (ref 6–20)
BUN/CREAT SERPL: 8 (ref 7–25)
BUPRENORPHINE SERPL-MCNC: NEGATIVE NG/ML
CALCIUM SPEC-SCNC: 9.5 MG/DL (ref 8.6–10.5)
CANNABINOIDS SERPL QL: NEGATIVE
CHLORIDE SERPL-SCNC: 101 MMOL/L (ref 98–107)
CLARITY UR: ABNORMAL
CO2 SERPL-SCNC: 21.4 MMOL/L (ref 22–29)
COCAINE UR QL: NEGATIVE
COLOR UR: YELLOW
CREAT SERPL-MCNC: 0.88 MG/DL (ref 0.57–1)
DEPRECATED RDW RBC AUTO: 39.8 FL (ref 37–54)
EGFRCR SERPLBLD CKD-EPI 2021: 94.2 ML/MIN/1.73
EOSINOPHIL # BLD AUTO: 0.17 10*3/MM3 (ref 0–0.4)
EOSINOPHIL NFR BLD AUTO: 2.1 % (ref 0.3–6.2)
ERYTHROCYTE [DISTWIDTH] IN BLOOD BY AUTOMATED COUNT: 12.5 % (ref 12.3–15.4)
ETHANOL BLD-MCNC: <10 MG/DL (ref 0–10)
ETHANOL UR QL: <0.01 %
FENTANYL UR-MCNC: NEGATIVE NG/ML
GLOBULIN UR ELPH-MCNC: 3.1 GM/DL
GLUCOSE SERPL-MCNC: 101 MG/DL (ref 65–99)
GLUCOSE UR STRIP-MCNC: NEGATIVE MG/DL
HAV IGM SERPL QL IA: NORMAL
HBV CORE IGM SERPL QL IA: NORMAL
HBV SURFACE AG SERPL QL IA: NORMAL
HCT VFR BLD AUTO: 45.5 % (ref 34–46.6)
HCV AB SER QL: NORMAL
HGB BLD-MCNC: 14.7 G/DL (ref 12–15.9)
HGB UR QL STRIP.AUTO: NEGATIVE
HOLD SPECIMEN: NORMAL
HYALINE CASTS UR QL AUTO: ABNORMAL /LPF
IMM GRANULOCYTES # BLD AUTO: 0.02 10*3/MM3 (ref 0–0.05)
IMM GRANULOCYTES NFR BLD AUTO: 0.2 % (ref 0–0.5)
KETONES UR QL STRIP: NEGATIVE
LEUKOCYTE ESTERASE UR QL STRIP.AUTO: ABNORMAL
LYMPHOCYTES # BLD AUTO: 3.43 10*3/MM3 (ref 0.7–3.1)
LYMPHOCYTES NFR BLD AUTO: 42.5 % (ref 19.6–45.3)
MAGNESIUM SERPL-MCNC: 2.2 MG/DL (ref 1.6–2.6)
MCH RBC QN AUTO: 28.2 PG (ref 26.6–33)
MCHC RBC AUTO-ENTMCNC: 32.3 G/DL (ref 31.5–35.7)
MCV RBC AUTO: 87.2 FL (ref 79–97)
METHADONE UR QL SCN: NEGATIVE
MONOCYTES # BLD AUTO: 0.55 10*3/MM3 (ref 0.1–0.9)
MONOCYTES NFR BLD AUTO: 6.8 % (ref 5–12)
NEUTROPHILS NFR BLD AUTO: 3.86 10*3/MM3 (ref 1.7–7)
NEUTROPHILS NFR BLD AUTO: 47.8 % (ref 42.7–76)
NITRITE UR QL STRIP: NEGATIVE
NRBC BLD AUTO-RTO: 0 /100 WBC (ref 0–0.2)
OPIATES UR QL: NEGATIVE
OXYCODONE UR QL SCN: NEGATIVE
PCP UR QL SCN: NEGATIVE
PH UR STRIP.AUTO: 5.5 [PH] (ref 5–8)
PLATELET # BLD AUTO: 239 10*3/MM3 (ref 140–450)
PMV BLD AUTO: 9.7 FL (ref 6–12)
POTASSIUM SERPL-SCNC: 4 MMOL/L (ref 3.5–5.2)
PROT SERPL-MCNC: 7.2 G/DL (ref 6–8.5)
PROT UR QL STRIP: NEGATIVE
RBC # BLD AUTO: 5.22 10*6/MM3 (ref 3.77–5.28)
RBC # UR STRIP: ABNORMAL /HPF
REF LAB TEST METHOD: ABNORMAL
SALICYLATES SERPL-MCNC: <0.3 MG/DL
SODIUM SERPL-SCNC: 133 MMOL/L (ref 136–145)
SP GR UR STRIP: 1.02 (ref 1–1.03)
SQUAMOUS #/AREA URNS HPF: ABNORMAL /HPF
TRICYCLICS UR QL SCN: POSITIVE
UROBILINOGEN UR QL STRIP: ABNORMAL
WBC # UR STRIP: ABNORMAL /HPF
WBC NRBC COR # BLD AUTO: 8.08 10*3/MM3 (ref 3.4–10.8)
WHOLE BLOOD HOLD COAG: NORMAL
WHOLE BLOOD HOLD SPECIMEN: NORMAL

## 2024-11-13 PROCEDURE — 87661 TRICHOMONAS VAGINALIS AMPLIF: CPT | Performed by: PSYCHIATRY & NEUROLOGY

## 2024-11-13 PROCEDURE — 83735 ASSAY OF MAGNESIUM: CPT | Performed by: PHYSICIAN ASSISTANT

## 2024-11-13 PROCEDURE — 87491 CHLMYD TRACH DNA AMP PROBE: CPT | Performed by: PSYCHIATRY & NEUROLOGY

## 2024-11-13 PROCEDURE — 93005 ELECTROCARDIOGRAM TRACING: CPT | Performed by: PSYCHIATRY & NEUROLOGY

## 2024-11-13 PROCEDURE — 80307 DRUG TEST PRSMV CHEM ANLYZR: CPT | Performed by: PHYSICIAN ASSISTANT

## 2024-11-13 PROCEDURE — 93010 ELECTROCARDIOGRAM REPORT: CPT | Performed by: INTERNAL MEDICINE

## 2024-11-13 PROCEDURE — 82077 ASSAY SPEC XCP UR&BREATH IA: CPT | Performed by: PHYSICIAN ASSISTANT

## 2024-11-13 PROCEDURE — 85025 COMPLETE CBC W/AUTO DIFF WBC: CPT | Performed by: PHYSICIAN ASSISTANT

## 2024-11-13 PROCEDURE — 80179 DRUG ASSAY SALICYLATE: CPT | Performed by: PHYSICIAN ASSISTANT

## 2024-11-13 PROCEDURE — 80053 COMPREHEN METABOLIC PANEL: CPT | Performed by: PHYSICIAN ASSISTANT

## 2024-11-13 PROCEDURE — 87086 URINE CULTURE/COLONY COUNT: CPT | Performed by: PSYCHIATRY & NEUROLOGY

## 2024-11-13 PROCEDURE — 81001 URINALYSIS AUTO W/SCOPE: CPT | Performed by: PHYSICIAN ASSISTANT

## 2024-11-13 PROCEDURE — 36415 COLL VENOUS BLD VENIPUNCTURE: CPT

## 2024-11-13 PROCEDURE — 81025 URINE PREGNANCY TEST: CPT | Performed by: PHYSICIAN ASSISTANT

## 2024-11-13 PROCEDURE — 99285 EMERGENCY DEPT VISIT HI MDM: CPT

## 2024-11-13 PROCEDURE — 80074 ACUTE HEPATITIS PANEL: CPT | Performed by: PSYCHIATRY & NEUROLOGY

## 2024-11-13 PROCEDURE — 80143 DRUG ASSAY ACETAMINOPHEN: CPT | Performed by: PHYSICIAN ASSISTANT

## 2024-11-13 PROCEDURE — 87591 N.GONORRHOEAE DNA AMP PROB: CPT | Performed by: PSYCHIATRY & NEUROLOGY

## 2024-11-13 RX ORDER — PROPRANOLOL HYDROCHLORIDE 10 MG/1
10 TABLET ORAL 2 TIMES DAILY PRN
Status: CANCELLED | OUTPATIENT
Start: 2024-11-13

## 2024-11-13 RX ORDER — ZIPRASIDONE HYDROCHLORIDE 40 MG/1
80 CAPSULE ORAL NIGHTLY
Status: CANCELLED | OUTPATIENT
Start: 2024-11-13

## 2024-11-13 RX ORDER — ZIPRASIDONE HYDROCHLORIDE 20 MG/1
20 CAPSULE ORAL 2 TIMES DAILY WITH MEALS
Status: CANCELLED | OUTPATIENT
Start: 2024-11-13

## 2024-11-13 RX ORDER — TRAZODONE HYDROCHLORIDE 50 MG/1
50 TABLET, FILM COATED ORAL NIGHTLY PRN
Status: DISCONTINUED | OUTPATIENT
Start: 2024-11-13 | End: 2024-11-14 | Stop reason: HOSPADM

## 2024-11-13 RX ORDER — PRENATAL VIT/IRON FUM/FOLIC AC 27MG-0.8MG
1 TABLET ORAL DAILY
Status: CANCELLED | OUTPATIENT
Start: 2024-11-13

## 2024-11-13 RX ORDER — DESVENLAFAXINE 50 MG/1
100 TABLET, FILM COATED, EXTENDED RELEASE ORAL DAILY
Status: CANCELLED | OUTPATIENT
Start: 2024-11-13

## 2024-11-13 RX ORDER — LEVOTHYROXINE SODIUM 50 UG/1
100 TABLET ORAL DAILY
Status: CANCELLED | OUTPATIENT
Start: 2024-11-13

## 2024-11-13 RX ORDER — TEMAZEPAM 15 MG/1
30 CAPSULE ORAL NIGHTLY
Status: CANCELLED | OUTPATIENT
Start: 2024-11-13

## 2024-11-13 RX ORDER — ATORVASTATIN CALCIUM 10 MG/1
20 TABLET, FILM COATED ORAL DAILY
Status: DISCONTINUED | OUTPATIENT
Start: 2024-11-13 | End: 2024-11-14 | Stop reason: HOSPADM

## 2024-11-13 RX ORDER — QUETIAPINE FUMARATE 100 MG/1
300 TABLET, FILM COATED ORAL NIGHTLY
Status: CANCELLED | OUTPATIENT
Start: 2024-11-13

## 2024-11-13 RX ORDER — ATORVASTATIN CALCIUM 10 MG/1
20 TABLET, FILM COATED ORAL DAILY
Status: CANCELLED | OUTPATIENT
Start: 2024-11-13

## 2024-11-13 RX ORDER — CELECOXIB 100 MG/1
100 CAPSULE ORAL 2 TIMES DAILY
Status: CANCELLED | OUTPATIENT
Start: 2024-11-13

## 2024-11-13 RX ORDER — ONDANSETRON 4 MG/1
4 TABLET, ORALLY DISINTEGRATING ORAL EVERY 6 HOURS PRN
Status: DISCONTINUED | OUTPATIENT
Start: 2024-11-13 | End: 2024-11-14 | Stop reason: HOSPADM

## 2024-11-13 RX ORDER — ALUMINA, MAGNESIA, AND SIMETHICONE 2400; 2400; 240 MG/30ML; MG/30ML; MG/30ML
15 SUSPENSION ORAL EVERY 6 HOURS PRN
Status: DISCONTINUED | OUTPATIENT
Start: 2024-11-13 | End: 2024-11-14 | Stop reason: HOSPADM

## 2024-11-13 RX ORDER — HYDROXYZINE HYDROCHLORIDE 50 MG/1
50 TABLET, FILM COATED ORAL EVERY 6 HOURS PRN
Status: DISCONTINUED | OUTPATIENT
Start: 2024-11-13 | End: 2024-11-14 | Stop reason: HOSPADM

## 2024-11-13 RX ORDER — LOPERAMIDE HYDROCHLORIDE 2 MG/1
2 CAPSULE ORAL
Status: DISCONTINUED | OUTPATIENT
Start: 2024-11-13 | End: 2024-11-14 | Stop reason: HOSPADM

## 2024-11-13 RX ORDER — LEVOTHYROXINE SODIUM 50 UG/1
100 TABLET ORAL DAILY
Status: DISCONTINUED | OUTPATIENT
Start: 2024-11-13 | End: 2024-11-14 | Stop reason: HOSPADM

## 2024-11-13 RX ORDER — BENZTROPINE MESYLATE 1 MG/ML
1 INJECTION, SOLUTION INTRAMUSCULAR; INTRAVENOUS ONCE AS NEEDED
Status: DISCONTINUED | OUTPATIENT
Start: 2024-11-13 | End: 2024-11-14 | Stop reason: HOSPADM

## 2024-11-13 RX ORDER — FAMOTIDINE 20 MG/1
20 TABLET, FILM COATED ORAL 2 TIMES DAILY PRN
Status: DISCONTINUED | OUTPATIENT
Start: 2024-11-13 | End: 2024-11-14 | Stop reason: HOSPADM

## 2024-11-13 RX ORDER — CELECOXIB 100 MG/1
100 CAPSULE ORAL 2 TIMES DAILY
Status: DISCONTINUED | OUTPATIENT
Start: 2024-11-13 | End: 2024-11-14 | Stop reason: HOSPADM

## 2024-11-13 RX ORDER — ZIPRASIDONE HYDROCHLORIDE 80 MG/1
80 CAPSULE ORAL NIGHTLY
COMMUNITY

## 2024-11-13 RX ORDER — NITROFURANTOIN 25; 75 MG/1; MG/1
100 CAPSULE ORAL EVERY 12 HOURS SCHEDULED
Status: DISCONTINUED | OUTPATIENT
Start: 2024-11-13 | End: 2024-11-14 | Stop reason: HOSPADM

## 2024-11-13 RX ORDER — ECHINACEA PURPUREA EXTRACT 125 MG
2 TABLET ORAL AS NEEDED
Status: DISCONTINUED | OUTPATIENT
Start: 2024-11-13 | End: 2024-11-14 | Stop reason: HOSPADM

## 2024-11-13 RX ORDER — POLYETHYLENE GLYCOL 3350 17 G/17G
17 POWDER, FOR SOLUTION ORAL DAILY PRN
Status: DISCONTINUED | OUTPATIENT
Start: 2024-11-13 | End: 2024-11-14 | Stop reason: HOSPADM

## 2024-11-13 RX ORDER — BENZONATATE 100 MG/1
100 CAPSULE ORAL 3 TIMES DAILY PRN
Status: DISCONTINUED | OUTPATIENT
Start: 2024-11-13 | End: 2024-11-14 | Stop reason: HOSPADM

## 2024-11-13 RX ORDER — QUETIAPINE FUMARATE 300 MG/1
300 TABLET, FILM COATED ORAL NIGHTLY
Status: ON HOLD | COMMUNITY
End: 2024-11-14

## 2024-11-13 RX ORDER — PROPRANOLOL HYDROCHLORIDE 10 MG/1
10 TABLET ORAL 2 TIMES DAILY PRN
Status: DISCONTINUED | OUTPATIENT
Start: 2024-11-13 | End: 2024-11-14 | Stop reason: HOSPADM

## 2024-11-13 RX ORDER — BENZTROPINE MESYLATE 1 MG/1
2 TABLET ORAL ONCE AS NEEDED
Status: DISCONTINUED | OUTPATIENT
Start: 2024-11-13 | End: 2024-11-14 | Stop reason: HOSPADM

## 2024-11-13 RX ORDER — IBUPROFEN 400 MG/1
400 TABLET, FILM COATED ORAL EVERY 6 HOURS PRN
Status: DISCONTINUED | OUTPATIENT
Start: 2024-11-13 | End: 2024-11-14 | Stop reason: HOSPADM

## 2024-11-13 RX ORDER — DESVENLAFAXINE 50 MG/1
100 TABLET, FILM COATED, EXTENDED RELEASE ORAL DAILY
Status: DISCONTINUED | OUTPATIENT
Start: 2024-11-13 | End: 2024-11-14 | Stop reason: HOSPADM

## 2024-11-13 RX ORDER — PRENATAL VIT/IRON FUM/FOLIC AC 27MG-0.8MG
1 TABLET ORAL DAILY
Status: DISCONTINUED | OUTPATIENT
Start: 2024-11-13 | End: 2024-11-14 | Stop reason: HOSPADM

## 2024-11-13 RX ORDER — ZIPRASIDONE HYDROCHLORIDE 20 MG/1
20 CAPSULE ORAL 2 TIMES DAILY WITH MEALS
COMMUNITY

## 2024-11-13 RX ADMIN — IBUPROFEN 400 MG: 400 TABLET, FILM COATED ORAL at 20:48

## 2024-11-13 RX ADMIN — PRENATAL VITAMINS-IRON FUMARATE 27 MG IRON-FOLIC ACID 0.8 MG TABLET 1 TABLET: at 17:16

## 2024-11-13 RX ADMIN — CELECOXIB 100 MG: 100 CAPSULE ORAL at 20:48

## 2024-11-13 RX ADMIN — ATORVASTATIN CALCIUM 20 MG: 10 TABLET, FILM COATED ORAL at 17:16

## 2024-11-13 RX ADMIN — LEVOTHYROXINE SODIUM 100 MCG: 0.05 TABLET ORAL at 17:16

## 2024-11-13 RX ADMIN — DESVENLAFAXINE 100 MG: 50 TABLET, FILM COATED, EXTENDED RELEASE ORAL at 17:16

## 2024-11-13 RX ADMIN — NITROFURANTOIN MONOHYDRATE/MACROCRYSTALS 100 MG: 75; 25 CAPSULE ORAL at 17:16

## 2024-11-13 NOTE — ED PROVIDER NOTES
Subjective   History of Present Illness  24-year-old female who presents to the ED today for a mental health evaluation.  She reports that she tried to kill herself last night.  She cut herself on her left wrist and then took 3 or 4 oxycodone 10 mg.  She states it was an old prescription that she had leftover from a previous  section.  She states she took this medication around 8 PM last night.  She states she feels like her symptoms are related to her medications.  She denies any homicidal ideations.  She denies any drug or alcohol use.  She states her appetite and sleep have both been poor.  She states she does hear voices telling her to harm herself.  She states she has a history of schizoaffective disorder.    History provided by:  Patient  Mental Health Problem  Presenting symptoms: depression, hallucinations and suicidal thoughts    Degree of incapacity (severity):  Severe  Onset quality:  Sudden  Duration:  1 day  Timing:  Constant  Progression:  Unchanged  Chronicity:  New  Context: not alcohol use and not drug abuse    Relieved by:  Nothing  Worsened by:  Nothing  Associated symptoms: appetite change and insomnia    Risk factors: hx of mental illness        Review of Systems   Constitutional:  Positive for appetite change.   HENT: Negative.     Eyes: Negative.    Respiratory: Negative.     Cardiovascular: Negative.    Gastrointestinal: Negative.    Genitourinary: Negative.    Musculoskeletal: Negative.    Skin: Negative.    Neurological: Negative.    Psychiatric/Behavioral:  Positive for dysphoric mood, hallucinations, sleep disturbance and suicidal ideas. The patient has insomnia.    All other systems reviewed and are negative.      Past Medical History:   Diagnosis Date    Asthma     Bipolar disorder     Chronic bronchitis     Depression     Elevated cholesterol     Encounter for IUD insertion 2023    KYLEENA    Endometriosis     Female infertility     Frequent UTI     GERD (gastroesophageal  "reflux disease)     Hypothyroidism     \"born with only 1/2 my thyroid\"     Kidney infection     Migraines     PCOS (polycystic ovarian syndrome)     Polycystic ovary syndrome     PONV (postoperative nausea and vomiting)     Pre-existing type 2 diabetes affecting pregnancy, antepartum     dx age 13    Preeclampsia     PTSD (post-traumatic stress disorder)     Recurrent pregnancy loss, antepartum condition or complication     Schizoaffective disorder        Allergies   Allergen Reactions    Morphine Anxiety    Latex Rash    Milk (Cow) Unknown (See Comments)    Tylenol [Acetaminophen] Hives and Itching     Reaction was caused by interaction with sleep med.        Past Surgical History:   Procedure Laterality Date    ANAL FISTULA REPAIR  2022     SECTION Bilateral 2021    Procedure:  SECTION PRIMARY;  Surgeon: Irineo Freire DO;  Location: [facility] LABOR DELIVERY;  Service: Obstetrics/Gynecology;  Laterality: Bilateral;    CHOLECYSTECTOMY      COLONOSCOPY      DIAGNOSTIC LAPAROSCOPY N/A 2022    Procedure: DIAGNOSTIC LAPAROSCOPY with Lysis of Adhesions;  Surgeon: Irineo Freire DO;  Location: [facility] OR;  Service: Obstetrics/Gynecology;  Laterality: N/A;    ENDOSCOPY      TONSILLECTOMY         Family History   Problem Relation Age of Onset    Asthma Maternal Grandmother     Asthma Paternal Grandmother     Cancer Other     Diabetes Other     Heart attack Other     Hyperlipidemia Other     Hypertension Other     Thyroid disease Other     Migraines Other        Social History     Socioeconomic History    Marital status:    Tobacco Use    Smoking status: Former     Current packs/day: 0.00     Types: Cigarettes     Quit date:      Years since quittin.8     Passive exposure: Never    Smokeless tobacco: Never   Vaping Use    Vaping status: Every Day    Substances: Nicotine, Flavoring    Devices: Pre-filled or refillable cartridge   Substance and Sexual Activity    " Alcohol use: Not Currently     Comment: occasional    Drug use: Not Currently     Types: Marijuana     Comment: last use 2018    Sexual activity: Not Currently     Partners: Male     Birth control/protection: None           Objective   Physical Exam  Vitals and nursing note reviewed.   Constitutional:       General: She is not in acute distress.     Appearance: Normal appearance. She is obese. She is not diaphoretic.   HENT:      Head: Normocephalic and atraumatic.      Right Ear: External ear normal.      Left Ear: External ear normal.      Nose: Nose normal.   Eyes:      Conjunctiva/sclera: Conjunctivae normal.      Pupils: Pupils are equal, round, and reactive to light.   Cardiovascular:      Rate and Rhythm: Regular rhythm. Tachycardia present.      Pulses: Normal pulses.      Heart sounds: Normal heart sounds.   Pulmonary:      Effort: Pulmonary effort is normal.      Breath sounds: Normal breath sounds.   Abdominal:      General: Bowel sounds are normal.      Palpations: Abdomen is soft.   Musculoskeletal:         General: Normal range of motion.      Cervical back: Normal range of motion and neck supple.   Skin:     General: Skin is warm and dry.      Capillary Refill: Capillary refill takes less than 2 seconds.   Neurological:      General: No focal deficit present.      Mental Status: She is alert and oriented to person, place, and time.   Psychiatric:         Mood and Affect: Mood is depressed.         Speech: Speech normal.         Behavior: Behavior normal. Behavior is cooperative.         Thought Content: Thought content includes suicidal ideation. Thought content does not include homicidal ideation. Thought content includes suicidal plan.         Procedures       Results for orders placed or performed during the hospital encounter of 11/13/24   Pregnancy, Urine - Urine, Clean Catch    Collection Time: 11/13/24  1:48 PM    Specimen: Urine, Clean Catch   Result Value Ref Range    HCG, Urine QL Negative  Negative   Urinalysis With Microscopic If Indicated (No Culture) - Urine, Clean Catch    Collection Time: 11/13/24  1:48 PM    Specimen: Urine, Clean Catch   Result Value Ref Range    Color, UA Yellow Yellow, Straw    Appearance, UA Cloudy (A) Clear    pH, UA 5.5 5.0 - 8.0    Specific Gravity, UA 1.017 1.005 - 1.030    Glucose, UA Negative Negative    Ketones, UA Negative Negative    Bilirubin, UA Negative Negative    Blood, UA Negative Negative    Protein, UA Negative Negative    Leuk Esterase, UA Trace (A) Negative    Nitrite, UA Negative Negative    Urobilinogen, UA 0.2 E.U./dL 0.2 - 1.0 E.U./dL   Urine Drug Screen - Urine, Clean Catch    Collection Time: 11/13/24  1:48 PM    Specimen: Urine, Clean Catch   Result Value Ref Range    THC, Screen, Urine Negative Negative    Phencyclidine (PCP), Urine Negative Negative    Cocaine Screen, Urine Negative Negative    Methamphetamine, Ur Negative Negative    Opiate Screen Negative Negative    Amphetamine Screen, Urine Negative Negative    Benzodiazepine Screen, Urine Positive (A) Negative    Tricyclic Antidepressants Screen Positive (A) Negative    Methadone Screen, Urine Negative Negative    Barbiturates Screen, Urine Negative Negative    Oxycodone Screen, Urine Negative Negative    Buprenorphine, Screen, Urine Negative Negative   Fentanyl, Urine - Urine, Clean Catch    Collection Time: 11/13/24  1:48 PM    Specimen: Urine, Clean Catch   Result Value Ref Range    Fentanyl, Urine Negative Negative   Urinalysis, Microscopic Only - Urine, Clean Catch    Collection Time: 11/13/24  1:48 PM    Specimen: Urine, Clean Catch   Result Value Ref Range    RBC, UA 0-2 None Seen, 0-2 /HPF    WBC, UA 6-10 (A) None Seen, 0-2 /HPF    Bacteria, UA 3+ (A) None Seen /HPF    Squamous Epithelial Cells, UA Too Numerous to Count (A) None Seen, 0-2 /HPF    Hyaline Casts, UA 3-6 None Seen /LPF    Methodology Automated Microscopy    [place] Urine Culture Tube - Urine, Clean Catch    Collection  Time: 11/13/24  1:48 PM    Specimen: Urine, Clean Catch   Result Value Ref Range    Extra Tube Hold for add-ons.    Comprehensive Metabolic Panel    Collection Time: 11/13/24  1:52 PM    Specimen: Blood   Result Value Ref Range    Glucose 101 (H) 65 - 99 mg/dL    BUN 7 6 - 20 mg/dL    Creatinine 0.88 0.57 - 1.00 mg/dL    Sodium 133 (L) 136 - 145 mmol/L    Potassium 4.0 3.5 - 5.2 mmol/L    Chloride 101 98 - 107 mmol/L    CO2 21.4 (L) 22.0 - 29.0 mmol/L    Calcium 9.5 8.6 - 10.5 mg/dL    Total Protein 7.2 6.0 - 8.5 g/dL    Albumin 4.1 3.5 - 5.2 g/dL    ALT (SGPT) 53 (H) 1 - 33 U/L    AST (SGOT) 34 (H) 1 - 32 U/L    Alkaline Phosphatase 99 39 - 117 U/L    Total Bilirubin 0.3 0.0 - 1.2 mg/dL    Globulin 3.1 gm/dL    A/G Ratio 1.3 g/dL    BUN/Creatinine Ratio 8.0 7.0 - 25.0    Anion Gap 10.6 5.0 - 15.0 mmol/L    eGFR 94.2 >60.0 mL/min/1.73   Ethanol    Collection Time: 11/13/24  1:52 PM    Specimen: Blood   Result Value Ref Range    Ethanol <10 0 - 10 mg/dL    Ethanol % <0.010 %   Acetaminophen Level    Collection Time: 11/13/24  1:52 PM    Specimen: Blood   Result Value Ref Range    Acetaminophen <5.0 0.0 - 30.0 mcg/mL   Salicylate Level    Collection Time: 11/13/24  1:52 PM    Specimen: Blood   Result Value Ref Range    Salicylate <0.3 <=30.0 mg/dL   Magnesium    Collection Time: 11/13/24  1:52 PM    Specimen: Blood   Result Value Ref Range    Magnesium 2.2 1.6 - 2.6 mg/dL   CBC Auto Differential    Collection Time: 11/13/24  1:52 PM    Specimen: Blood   Result Value Ref Range    WBC 8.08 3.40 - 10.80 10*3/mm3    RBC 5.22 3.77 - 5.28 10*6/mm3    Hemoglobin 14.7 12.0 - 15.9 g/dL    Hematocrit 45.5 34.0 - 46.6 %    MCV 87.2 79.0 - 97.0 fL    MCH 28.2 26.6 - 33.0 pg    MCHC 32.3 31.5 - 35.7 g/dL    RDW 12.5 12.3 - 15.4 %    RDW-SD 39.8 37.0 - 54.0 fl    MPV 9.7 6.0 - 12.0 fL    Platelets 239 140 - 450 10*3/mm3    Neutrophil % 47.8 42.7 - 76.0 %    Lymphocyte % 42.5 19.6 - 45.3 %    Monocyte % 6.8 5.0 - 12.0 %     Eosinophil % 2.1 0.3 - 6.2 %    Basophil % 0.6 0.0 - 1.5 %    Immature Grans % 0.2 0.0 - 0.5 %    Neutrophils, Absolute 3.86 1.70 - 7.00 10*3/mm3    Lymphocytes, Absolute 3.43 (H) 0.70 - 3.10 10*3/mm3    Monocytes, Absolute 0.55 0.10 - 0.90 10*3/mm3    Eosinophils, Absolute 0.17 0.00 - 0.40 10*3/mm3    Basophils, Absolute 0.05 0.00 - 0.20 10*3/mm3    Immature Grans, Absolute 0.02 0.00 - 0.05 10*3/mm3    nRBC 0.0 0.0 - 0.2 /100 WBC   Green Top (Gel)    Collection Time: 11/13/24  1:52 PM   Result Value Ref Range    Extra Tube Hold for add-ons.    Lavender Top    Collection Time: 11/13/24  1:52 PM   Result Value Ref Range    Extra Tube hold for add-on    Gold Top - SST    Collection Time: 11/13/24  1:52 PM   Result Value Ref Range    Extra Tube Hold for add-ons.    Light Blue Top    Collection Time: 11/13/24  1:52 PM   Result Value Ref Range    Extra Tube Hold for add-ons.           ED Course  ED Course as of 11/13/24 1636   Wed Nov 13, 2024   1415 Medically clear for psych [AH]      ED Course User Index  [AH] Katarina Russell PA                    No data recorded                             Medical Decision Making  24-year-old female who presents to the ED today for mental health evaluation.  She was medically cleared for the evaluation.  Psychiatry was consulted and she will be admitted.    Problems Addressed:  Depression with suicidal ideation: complicated acute illness or injury    Amount and/or Complexity of Data Reviewed  Labs: ordered.        Final diagnoses:   Depression with suicidal ideation       ED Disposition  ED Disposition       ED Disposition   DC/Transfer to Behavioral Health    Condition   Stable    Comment   --               No follow-up provider specified.       Medication List        Stop      Dexcom G6 Sensor     Dexcom G6 Transmitter misc                 Katarina Russell PA  11/13/24 1636

## 2024-11-13 NOTE — NURSING NOTE
Spoke to  discussed pt assessment and labs. New orders to admit pt to AE1 with routine orders SP3.   Instructed if pt doesn't voluntarily go, she will be placed on a hold.   RBVOx2  ED provider made aware.

## 2024-11-13 NOTE — PLAN OF CARE
"Goal Outcome Evaluation:  Plan of Care Reviewed With: patient  Plan of Care Reviewed With: patient  Patient Agreement with Plan of Care: agrees     Progress: no change  Outcome Evaluation: Patient brought to the ED by her family for auditory and visual hallucinations. Patient states, \"I've had hallucinations all my life but they have been really bad the past 2 weeks.\" Patient present very tearful. She reports she attempted to overdose last night because she couldn't take the hallucinations anymore. Patient states, \" I see shadow people and hearing voices that say they hate me I should kill myself and nobody loves me.\" Patient has 3 superficial cuts to the right wrist she done last night with a knife, states \"I wasn't trying to kill myself with that, its just a coping mechanism.\" Denies HI. Patient rates anxiety 4/10. Depression 10/10. Denies drug or alcohol abuse. Sleep-fair. Appetite-poor. Patient states she has attempted to kill herself by overdosing in 2017,2018, and 2020. Patient had +3 bacteria in urine and was stated on Macrobid 100 mg BID.                             "

## 2024-11-13 NOTE — NURSING NOTE
"Pt assessment complete. Pt states last night she took 3 or 4 10mg oxycodone to kill herself because the voices in her head wouldn't stop. Pt states she is also seeing dark figures.   Pt states this has been going on for around a month so this morning her  took her to her psychiatrist but they wouldn't see her, they told him to bring her straight here.   Pt has 3 superficial cuts to the right wrist she done lastnight with a knife, states \"I wasn't trying to kill myself with that, its just a coping mechanism.\"     Pt denies HI.     Pt denies etoh and drugs.     Pt states she has attempted to kill herself by overdosing in 2017,2018, and 2020.    Pt rates anxiety 5  Pt rates depression 8    "

## 2024-11-14 VITALS
SYSTOLIC BLOOD PRESSURE: 140 MMHG | HEART RATE: 90 BPM | HEIGHT: 64 IN | DIASTOLIC BLOOD PRESSURE: 96 MMHG | BODY MASS INDEX: 39.64 KG/M2 | TEMPERATURE: 97.6 F | WEIGHT: 232.2 LBS | OXYGEN SATURATION: 99 % | RESPIRATION RATE: 18 BRPM

## 2024-11-14 PROBLEM — R45.851 SUICIDAL IDEATION: Status: RESOLVED | Noted: 2024-11-13 | Resolved: 2024-11-14

## 2024-11-14 PROBLEM — F31.9 BIPOLAR DISORDER, UNSPECIFIED: Status: ACTIVE | Noted: 2024-11-14

## 2024-11-14 PROBLEM — E03.9 HYPOTHYROIDISM: Status: ACTIVE | Noted: 2024-11-14

## 2024-11-14 PROBLEM — F60.89 CLUSTER B PERSONALITY DISORDER: Status: ACTIVE | Noted: 2024-11-14

## 2024-11-14 LAB
C TRACH RRNA CVX QL NAA+PROBE: NOT DETECTED
N GONORRHOEA RRNA SPEC QL NAA+PROBE: NOT DETECTED
QT INTERVAL: 376 MS
QTC INTERVAL: 447 MS
TRICHOMONAS VAGINALIS PCR: NOT DETECTED

## 2024-11-14 PROCEDURE — 99223 1ST HOSP IP/OBS HIGH 75: CPT | Performed by: PSYCHIATRY & NEUROLOGY

## 2024-11-14 RX ORDER — TOPIRAMATE 25 MG/1
25 TABLET, FILM COATED ORAL NIGHTLY
Qty: 30 TABLET | Refills: 0 | Status: SHIPPED | OUTPATIENT
Start: 2024-11-14

## 2024-11-14 RX ORDER — QUETIAPINE 300 MG/1
300 TABLET, FILM COATED, EXTENDED RELEASE ORAL NIGHTLY
Status: DISCONTINUED | OUTPATIENT
Start: 2024-11-14 | End: 2024-11-14 | Stop reason: HOSPADM

## 2024-11-14 RX ORDER — TEMAZEPAM 15 MG/1
30 CAPSULE ORAL NIGHTLY
Status: DISCONTINUED | OUTPATIENT
Start: 2024-11-14 | End: 2024-11-14 | Stop reason: HOSPADM

## 2024-11-14 RX ORDER — TOPIRAMATE 25 MG/1
25 TABLET, FILM COATED ORAL NIGHTLY
Status: DISCONTINUED | OUTPATIENT
Start: 2024-11-14 | End: 2024-11-14 | Stop reason: HOSPADM

## 2024-11-14 RX ORDER — ZIPRASIDONE HYDROCHLORIDE 20 MG/1
20 CAPSULE ORAL 2 TIMES DAILY WITH MEALS
Status: DISCONTINUED | OUTPATIENT
Start: 2024-11-14 | End: 2024-11-14 | Stop reason: HOSPADM

## 2024-11-14 RX ORDER — ZIPRASIDONE HYDROCHLORIDE 40 MG/1
80 CAPSULE ORAL NIGHTLY
Status: DISCONTINUED | OUTPATIENT
Start: 2024-11-14 | End: 2024-11-14 | Stop reason: HOSPADM

## 2024-11-14 RX ORDER — QUETIAPINE 300 MG/1
300 TABLET, FILM COATED, EXTENDED RELEASE ORAL NIGHTLY
COMMUNITY

## 2024-11-14 RX ADMIN — NITROFURANTOIN MONOHYDRATE/MACROCRYSTALS 100 MG: 75; 25 CAPSULE ORAL at 08:53

## 2024-11-14 RX ADMIN — LEVOTHYROXINE SODIUM 100 MCG: 0.05 TABLET ORAL at 08:53

## 2024-11-14 RX ADMIN — DESVENLAFAXINE 100 MG: 50 TABLET, FILM COATED, EXTENDED RELEASE ORAL at 08:53

## 2024-11-14 RX ADMIN — ATORVASTATIN CALCIUM 20 MG: 10 TABLET, FILM COATED ORAL at 08:53

## 2024-11-14 RX ADMIN — PRENATAL VITAMINS-IRON FUMARATE 27 MG IRON-FOLIC ACID 0.8 MG TABLET 1 TABLET: at 08:53

## 2024-11-14 RX ADMIN — CELECOXIB 100 MG: 100 CAPSULE ORAL at 08:53

## 2024-11-14 NOTE — DISCHARGE INSTR - APPOINTMENTS
[email or web address]  DR. STEPHANIE YOUNG  [address], [place] [address]  [phone]        Appointment December 11th, 2024 at 10:30 am    The office is working to schedule an appointment sooner and will contact you when scheduled.

## 2024-11-14 NOTE — H&P
"      INITIAL PSYCHIATRIC HISTORY & PHYSICAL    Patient Identification:  Name:  Flavia Mcclure  Age:  24 y.o.  Sex:  female  :  2000  MRN:  [id number]   Visit Number:  [id number]  Primary Care Physician:  Lisa Cassidy MD    SUBJECTIVE    CC/Focus of Exam: Concern for SI and psychosis    HPI: Flavia Mcclure is a 24 y.o. female who was admitted on 2024 with concern for SI and psychosis, referred by office staff at outpatient clinic after voicing recent SI and hallucinations.  Patient reports some disagreement with information relayed in the emergency department.    ED documentations says patient reported taking \"3 or 4 10 mg oxycodone to kill herself\" because of auditory and visual hallucinations.  Patient also had 3 superficial cuts to her right wrist.  She reports being sent straight to the hospital from outpatient psychiatrist's office due to these concerns.  Patient denies taking any medication to overdose, saying she only \"had the thoughts,\" also saying that the stated amount of medication would not kill someone.  Patient reports she feels better today and is experiencing no further AVH.  She reports excessive anxiety about being away from her daughter and .     Patient reports worsening hallucinations \"come and go,\" with auditory hallucinations of voices generally \"saying my name\" and visual hallucinations of \"shadowy figures.\"  Patient does not display any objective signs of psychosis otherwise, nor reports a history of disorganization, delay, or any other significant symptoms of schizophrenia.  She reports a history of bipolar disorder, primarily with mood lability and severe insomnia.  She reports that symptoms were well-controlled until the recent addition of Dayvigo, with symptoms worsening following initiation of this new medication.  She reports it was started to help with nightmares, as she previously poorly tolerated prazosin.  She was agreeable to stop Dayvigo " "and begin topiramate for these symptoms.  Patient continues to deny SI or any acutely concerning symptoms that would necessitate hospitalization.  She is requesting discharge and agreeable to family involvement for safety planning.  Presentation suggests borderline personality traits.    PAST PSYCHIATRIC HX:  Dx: Bipolar, schizophrenia, PTSD  IP: Denied  OP: Michael  Current meds: Per med rec  Previous meds: Lamotrigine, cariprazine, others that she cannot recall  SH/SI/SA: History of superficial cutting/intermittent/reports 3 previous attempts between  through   Trauma: Endorsed    SUBSTANCE USE HX:  Denies abuse of alcohol, THC, illicit drugs  Admission UDS + benzo, TCA    SOCIAL HX:  Lives in [place] with  and daughter  Legal: Denied    FAMILY HX:    Family History   Problem Relation Age of Onset    Asthma Maternal Grandmother     Asthma Paternal Grandmother     Cancer Other     Diabetes Other     Heart attack Other     Hyperlipidemia Other     Hypertension Other     Thyroid disease Other     Migraines Other        Past Medical History:   Diagnosis Date    Asthma     Bipolar disorder     Chronic bronchitis     Depression     Elevated cholesterol     Encounter for IUD insertion 2023    KYLEENA    Endometriosis     Female infertility     Frequent UTI     GERD (gastroesophageal reflux disease)     Hypothyroidism     \"born with only 1/2 my thyroid\"     Kidney infection     Migraines     PCOS (polycystic ovarian syndrome)     Polycystic ovary syndrome     PONV (postoperative nausea and vomiting)     Pre-existing type 2 diabetes affecting pregnancy, antepartum     dx age 13    Preeclampsia     PTSD (post-traumatic stress disorder)     Recurrent pregnancy loss, antepartum condition or complication     Schizoaffective disorder        Past Surgical History:   Procedure Laterality Date    ANAL FISTULA REPAIR  2022     SECTION Bilateral 2021    Procedure:  SECTION " PRIMARY;  Surgeon: Irineo Freire DO;  Location: [facility] LABOR DELIVERY;  Service: Obstetrics/Gynecology;  Laterality: Bilateral;    CHOLECYSTECTOMY      COLONOSCOPY      DIAGNOSTIC LAPAROSCOPY N/A 12/28/2022    Procedure: DIAGNOSTIC LAPAROSCOPY with Lysis of Adhesions;  Surgeon: Irineo Freire DO;  Location: [facility] OR;  Service: Obstetrics/Gynecology;  Laterality: N/A;    ENDOSCOPY      TONSILLECTOMY         Medications Prior to Admission   Medication Sig Dispense Refill Last Dose/Taking    atorvastatin (LIPITOR) 20 MG tablet Take 1 tablet by mouth Daily.   11/12/2024    desvenlafaxine (Pristiq) 100 MG 24 hr tablet Take 1 tablet by mouth Daily. 30 tablet 1 11/12/2024    Lemborexant 10 MG tablet Take 1 tablet by mouth Every Night.   Past Week    levothyroxine (SYNTHROID, LEVOTHROID) 100 MCG tablet Take 1 tablet by mouth Daily.   11/12/2024    Prenatal Vit-Fe Fumarate-FA (prenatal vitamin 28-0.8) 28-0.8 MG tablet tablet Take 1 tablet by mouth Daily. 90 tablet 3 11/12/2024    propranolol (INDERAL) 10 MG tablet Take 1 tablet by mouth 2 (Two) Times a Day As Needed (panic). 60 tablet 1 11/12/2024    QUEtiapine XR (SEROquel XR) 300 MG 24 hr tablet Take 1 tablet by mouth Every Night.   Past Week    temazepam (RESTORIL) 30 MG capsule Take 1 capsule by mouth Every Night.   11/12/2024    ziprasidone (GEODON) 20 MG capsule Take 1 capsule by mouth 2 (Two) Times a Day With Meals.   11/12/2024    ziprasidone (GEODON) 80 MG capsule Take 1 capsule by mouth Every Night.   11/12/2024         ALLERGIES:  Morphine, Latex, Milk (cow), and Tylenol [acetaminophen]    Temp:  [97 °F (36.1 °C)-98.6 °F (37 °C)] 98.1 °F (36.7 °C)  Heart Rate:  [] 99  Resp:  [16-18] 18  BP: ()/() 108/68    REVIEW OF SYSTEMS:  Review of Systems   Psychiatric/Behavioral:  The patient is nervous/anxious.    All other systems reviewed and are negative.       OBJECTIVE    PHYSICAL EXAM:  Physical Exam  Vitals and nursing note  reviewed.   Constitutional:       Appearance: She is well-developed.   HENT:      Head: Normocephalic and atraumatic.      Right Ear: External ear normal.      Left Ear: External ear normal.      Nose: Nose normal.   Eyes:      Pupils: Pupils are equal, round, and reactive to light.   Pulmonary:      Effort: Pulmonary effort is normal. No respiratory distress.      Breath sounds: Normal breath sounds.   Abdominal:      General: There is no distension.      Palpations: Abdomen is soft.   Musculoskeletal:         General: No deformity. Normal range of motion.      Cervical back: Normal range of motion and neck supple.   Skin:     General: Skin is warm.      Findings: No rash.   Neurological:      Mental Status: She is alert and oriented to person, place, and time.      Coordination: Coordination normal.       Cranial Nerves: I. No anosmia. II: No visual disturbance. III, IV VI: EOMI, PERRLA. V: Corneal reflext intact, no abnormal sensations. VII: No facial palsy, or altered sensation. VIII: Hearing intact, balance intact. IX: Intact ah reflex. X: Normal phonation, swallowing. XI: Normal shrug and head movement. XII: Intact tongue movements      MENTAL STATUS EXAM:   Hygiene:   good  Cooperation:  Cooperative  Eye Contact:  Good  Psychomotor Behavior:  Appropriate  Affect:  Anxious  Hopelessness: Denies  Speech:  Normal  Thought Process: Goal directed and Linear  Thought Content:  Normal  Suicidal:  None  Homicidal:  None  Hallucinations:  Not demonstrated today  Delusion:  None  Memory:  Intact  Orientation:  Person, Place, Time, and Situation  Reliability:  fair  Insight:  Fair  Judgment:  Fair  Impulse Control:  Fair      Imaging Results (Last 24 Hours)       ** No results found for the last 24 hours. **             Lab Results   Component Value Date    GLUCOSE 101 (H) 11/13/2024    BUN 7 11/13/2024    CREATININE 0.88 11/13/2024    EGFRIFNONA 126 06/07/2021    EGFRIFAFRI  12/09/2017      Comment:      Unable to  calculate GFR, patient age <=18.    BCR 8.0 11/13/2024    CO2 21.4 (L) 11/13/2024    CALCIUM 9.5 11/13/2024    ALBUMIN 4.1 11/13/2024    AST 34 (H) 11/13/2024    ALT 53 (H) 11/13/2024       Lab Results   Component Value Date    WBC 8.08 11/13/2024    HGB 14.7 11/13/2024    HCT 45.5 11/13/2024    MCV 87.2 11/13/2024     11/13/2024       ECG/EMG Results (most recent)       Procedure Component Value Units Date/Time    ECG 12 Lead Other; Baseline Cardiac Status [[id number]] Collected: 11/13/24 1705     Updated: 11/13/24 1706     QT Interval 376 ms      QTC Interval 447 ms     Narrative:      Test Reason : Other~  Blood Pressure :   */*   mmHG  Vent. Rate :  85 BPM     Atrial Rate :  85 BPM     P-R Int : 124 ms          QRS Dur :  80 ms      QT Int : 376 ms       P-R-T Axes :  33  22  54 degrees    QTcB Int : 447 ms    Normal sinus rhythm  Cannot rule out Anterior infarct , age undetermined  Abnormal ECG  When compared with ECG of 27-Dec-2022 09:14,  No significant change was found    Referred By: AILE           Confirmed By:              Brief Urine Lab Results  (Last result in the past 365 days)        Color   Clarity   Blood   Leuk Est   Nitrite   Protein   CREAT   Urine HCG        11/13/24 1348 Yellow   Cloudy   Negative   Trace   Negative   Negative           11/13/24 1348               Negative               Last Urine Toxicity          Latest Ref Rng & Units 11/13/2024   LAST URINE TOXICITY RESULTS   Amphetamine, Urine Qual Negative Negative    Barbiturates Screen, Urine Negative Negative    Benzodiazepine Screen, Urine Negative Positive    Buprenorphine, Screen, Urine Negative Negative    Cocaine Screen, Urine Negative Negative    Fentanyl, Urine Negative Negative    Methadone Screen , Urine Negative Negative    Methamphetamine, Ur Negative Negative       Details                   Chart, notes, vitals, labs personally reviewed.  ALT/AST 53/34  UA: 0 RBC, 6-10 WBC, negative nitrite  Outside [facility] report  requested, error in processing  UDS results: + Benzo, TCA  EKG tracing personally reviewed, interpreted as normal sinus rhythm, QTc interval 447  Consulted with patient's therapist regarding clinical history and treatment plan    ASSESSMENT & PLAN:    Suicidal Ideation/attempt by overdose  -Patient reports thoughts of SI, but denies an actual attempt or any intention to harm herself.  -Admit for crisis stabilization  -SP3    Unspecified bipolar disorder  -Patient reports worsening hallucinations leading to suicidality  -Continue home medication, with the exception of Dayvigo  -Begin topiramate 25 mg nightly  -We will establish outpatient psychiatric care following hospitalization    Urinary tract infection  -UA with 0 RBC, 6-10 WBC, negative nitrite  -Ordered culture and STI testing    Hospital bed: No    The patient has been admitted for safety and stabilization.  Patient will be monitored for suicidality daily and maintained on Special Precautions Level 3 (q15 min checks) .  The patient will have individual and group therapy with a master's level therapist. A master treatment plan will be developed and agreed upon by the patient and his/her treatment team.  The patient's estimated length of stay in the hospital is 1-3 days.

## 2024-11-14 NOTE — DISCHARGE SUMMARY
PSYCHIATRIC DISCHARGE SUMMARY     Patient Identification:  Name:  Flavia Mcclure  Age:  24 y.o.  Sex:  female  :  2000  MRN:  [id number]  Visit Number:  [id number]    Date of Admission:2024   Date of Discharge: 2024     Discharge Diagnosis:  Active Problems:    Cluster B personality disorder    Bipolar disorder, unspecified    Hypothyroidism      Admission Diagnosis:  Suicidal ideation [R45.851]  Suicidal ideation [R45.851]     Hospital Course  Patient is a 24 y.o. female presented with concern for SI and hallucinations.  Admitted for crisis stabilization.  No acutely concerning labs on admission.  Patient's account of recent symptoms were either misunderstood in the ED or changed following hospitalization.  Patient reports brief intermittent increase in hallucinations, specifically voices saying her name and visions of shadowy figures.  Patient denied actually taking any medication in a suicide attempt, saying she only had fleeting thoughts of doing this.  Patient reports major recent distress has been increased nightmares, following initiation of new medication, Dayvigo.  Patient displayed no disorganization, delay, poor attention to ADLs, negative or positive symptoms of psychosis.  Patient denied any active suicidality, denied any intention for self-harm, and reported resolution of hallucinations, suggesting symptom burden was not truly psychotic in nature.  Clinical history and symptom burden appears more likely to be related to borderline personality traits than other diagnoses as reported by the patient.  Patient agreeable to begin topiramate for nightmares and to manage side effects of psychotropic medication.  Patient requested discharge and agreed for contact with family for safety planning.   was contacted, voiced no concerns with patient returning home today.  Patient appropriate for discharge at this time.    By the conclusion of this hospitalization, patient is  "exhibiting no acutely concerning symptoms of mood, psychotic or thought disorder that would necessitate further inpatient care. Patient is also denying SI, HI, and AVH. Patient has shown improvement of presenting symptoms, exhibited no behavior concerning for harm to self or others, and is considered appropriate for discharge to a lower level of care today. Treatment and safe discharge planning completed. Outpatient care ascertained.     Mental Status Exam upon discharge:   Mood \"good.  Ready to go home and see my daughter.\"   Affect-congruent, appropriate, stable  Thought Content-goal directed, no delusional material present  Thought process-linear, organized.  Suicidality: No SI  Homicidality: No HI  Perception: No AH/VH    Procedures Performed         Consults:   Consults       No orders found for last 30 day(s).            Pertinent Test Results:   Lab Results (last 7 days)       Procedure Component Value Units Date/Time    Chlamydia trachomatis, Neisseria gonorrhoeae, Trichomonas vaginalis, PCR - Urine, Urine, Clean Catch [[id number]]  (Normal) Collected: 11/13/24 1348    Specimen: Urine, Clean Catch Updated: 11/14/24 1309     Chlamydia DNA by PCR Not Detected     Neisseria gonorrhoeae by PCR Not Detected     Trichomonas vaginalis PCR Not Detected    Urine Culture - Urine, Urine, Clean Catch [[id number]] Collected: 11/13/24 1348    Specimen: Urine, Clean Catch Updated: 11/14/24 1026    Hepatitis Panel, Acute [[id number]]  (Normal) Collected: 11/13/24 1345    Specimen: Blood Updated: 11/13/24 1541     Hepatitis B Surface Ag Non-Reactive     Hep A IgM Non-Reactive     Hep B C IgM Non-Reactive     Hepatitis C Ab Non-Reactive    Narrative:      Results may be falsely decreased if patient taking Biotin.             Condition on Discharge:  improved    Vital Signs  Temp:  [97 °F (36.1 °C)-98.6 °F (37 °C)] 97.6 °F (36.4 °C)  Heart Rate:  [77-99] 90  Resp:  [16-18] 18  BP: ()/() 140/96    Discharge " Disposition:  Home or Self Care    Discharge Medications:     Discharge Medications        New Medications        Instructions Start Date   topiramate 25 MG tablet  Commonly known as: TOPAMAX   25 mg, Oral, Nightly             Continue These Medications        Instructions Start Date   atorvastatin 20 MG tablet  Commonly known as: LIPITOR   20 mg, Oral, Daily      desvenlafaxine 100 MG 24 hr tablet  Commonly known as: Pristiq   100 mg, Oral, Daily      levothyroxine 100 MCG tablet  Commonly known as: SYNTHROID, LEVOTHROID   100 mcg, Daily      prenatal vitamin 28-0.8 28-0.8 MG tablet tablet   1 tablet, Oral, Daily      propranolol 10 MG tablet  Commonly known as: INDERAL   10 mg, Oral, 2 Times Daily PRN      QUEtiapine  MG 24 hr tablet  Commonly known as: SEROquel XR   300 mg, Nightly      temazepam 30 MG capsule  Commonly known as: RESTORIL   30 mg, Oral, Nightly      ziprasidone 20 MG capsule  Commonly known as: GEODON   20 mg, Oral, 2 Times Daily With Meals      ziprasidone 80 MG capsule  Commonly known as: GEODON   80 mg, Oral, Nightly             Stop These Medications      Lemborexant 10 MG tablet              Discharge Diet: Normal  Diet Instructions    Regular            Activity at Discharge: Normal  Activity Instructions    As tolerated            Follow-up Appointments  No future appointments.      Test Results Pending at Discharge  None   Pending Labs       Order Current Status    Urine Culture - Urine, Urine, Clean Catch In process            Time: I spent less than 30 minutes on this discharge activity which included: face-to-face encounter with the patient, reviewing the data in the system, coordination of the care with the nursing staff as well as consultants, documentation, and entering orders.      Clinician:   Chi Renteria MD  11/14/24  15:57 EST

## 2024-11-14 NOTE — PLAN OF CARE
Goal Outcome Evaluation:  Plan of Care Reviewed With: patient  Plan of Care Reviewed With: patient  Patient Agreement with Plan of Care: agrees     Progress: no change  Outcome Evaluation: Pt rated anxiety 4/10, depression 6/10, denied SI/HI, positive for VH. Pt reports seeing shadow people. Poor sleep and appetite. Pt spent the majority of free time in dayroom interacting with peers.
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
## 2024-11-14 NOTE — NURSING NOTE
[facility]  [phone] called requesting update on patient plan of care- Consent signed in chart.   Updated at this time. Requesting to speak with Therapist for continuum of care.

## 2024-11-15 LAB — BACTERIA SPEC AEROBE CULT: NORMAL
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
## 2025-01-31 ENCOUNTER — TELEPHONE (OUTPATIENT)
Dept: OBSTETRICS AND GYNECOLOGY | Facility: CLINIC | Age: 25
End: 2025-01-31
Payer: COMMERCIAL

## 2025-01-31 NOTE — TELEPHONE ENCOUNTER
Provider: DR. PARKER    Caller: Flavia Mcclure    Relationship to Patient: Self    Pharmacy: [facility], [place] - [address] [facility] [address] - [phone] [facility] [phone] FX [[id number]]     Phone Number: [phone]    Reason for Call: Vaginitis     When was the patient last seen: 09.06.24    When did it start: ABOUT 2 DAY AGO    PATIENT CALLING IN WITH SYMPTOMS OF VAGINITIS. PATIENT STATING THAT IT BURNS WHEN SHE URINATE, NO VAGINAL ODOR OR DISCHARGE. THE AREA BETWEEN THE VAGINA AND ANUS IS REALLY SWOLLEN AND IRRITATED AND PATIENT STATED THAT IT REALLY HURTS     PATIENT CAN BE REACHED [phone]    THANK YOU

## 2025-02-14 ENCOUNTER — TELEPHONE (OUTPATIENT)
Dept: OBSTETRICS AND GYNECOLOGY | Facility: CLINIC | Age: 25
End: 2025-02-14

## 2025-02-14 NOTE — TELEPHONE ENCOUNTER
Caller: Flavia Mcclure     Relationship: SELF     Best call back number: [phone]    What is your medical concern? STARTED BLEEDING W/HEAVY CRAMPING - BELIEVES IT MAY BE A RUPTURED CYST AS PERIOD WAS NOT DUE FOR A FEW MORE DAYS    How long has this issue been going on? YESTERDAY    Is your provider already aware of this issue? NO    Have you been treated for this issue? NO    PLEASE CALL TO ADVISE/DISCUSS

## 2025-02-14 NOTE — TELEPHONE ENCOUNTER
Spoke with patient and she is midcycle. Advised that this can be normal. She is trying for pregnancy. I explained that this is probably self limiting and it will straighten out after next period. If bleeding gets heavier or pain increases go to the ER

## 2025-03-05 ENCOUNTER — TELEPHONE (OUTPATIENT)
Dept: OBSTETRICS AND GYNECOLOGY | Facility: CLINIC | Age: 25
End: 2025-03-05
Payer: COMMERCIAL

## 2025-03-05 NOTE — TELEPHONE ENCOUNTER
Provider:  DR PARKER    Caller: SIM MAURER    Phone Number: [phone]     Reason for Call: PATIENT WAS CALLING TO SPEAK WITH MA- IS HAVING A LOT OF PAIN AND SHE ISNT SURE IF ITS A CYST OR WHAT AND WHAT SHE CAN DO//HUB WASN'T ABLE TO REACH THE CLINICAL LINE//PLEASE FOLLOW UP

## 2025-03-05 NOTE — TELEPHONE ENCOUNTER
Spoke with patient and she advised that her last period was in January.  Patient is going to take a pregnancy test to rule out pregnancy.  If pain persists she was instructed to go to the ER.   Abdominal Pain, N/V/D

## 2025-03-17 ENCOUNTER — TELEPHONE (OUTPATIENT)
Dept: UROLOGY | Facility: CLINIC | Age: 25
End: 2025-03-17

## 2025-03-17 NOTE — TELEPHONE ENCOUNTER
Hub staff attempted to follow warm transfer process and was unsuccessful     Caller: Flavia Mcclure     Relationship to patient: SELF    Best call back number: [phone]     Patient is needing: PT WAS CALLING TO SCHEDULE AN APPT. SHE STATES SHE IS HAVING TROUBLE URINATING. PT STATES SHE IS ABLE TO URINATE SMALL AMOUNTS.    PT WAS ADVISED THAT IF THE OFFICE IS UNABLE TO CONTACT HER BEFORE CLOSING SHE MIGHT CONSIDER GOING TO THE ED, PT STATES SHE REFUSES TO GO TO ED.

## 2025-03-18 ENCOUNTER — TELEPHONE (OUTPATIENT)
Dept: UROLOGY | Facility: CLINIC | Age: 25
End: 2025-03-18
Payer: COMMERCIAL

## 2025-03-18 NOTE — TELEPHONE ENCOUNTER
from ER in [place] called for patient to be seen for retention and spoke with Michael as to scheduling.  Pt was put on schedule for Thursday and  was notified of appt date & time.

## 2025-03-18 NOTE — TELEPHONE ENCOUNTER
PATIENT CALLED BACK TO LET US KNOW SHE IS HEADED TO THE ED DEPT. WITH URINARY RETENTION    HUB AGENT UNABLE TO WARM TRANSFER

## 2025-03-19 NOTE — TELEPHONE ENCOUNTER
Hub staff attempted to follow warm transfer process and was unsuccessful     Caller: Flavia Mcclure    Relationship to patient: Self    Best call back number: [phone]    Patient is needing: PT RETURNED A MISSED CALL FROM [place]. PLEASE CALL HER BACK AT YOUR EARLIEST CONVENIENCE. THANK YOU

## 2025-03-20 ENCOUNTER — OFFICE VISIT (OUTPATIENT)
Dept: UROLOGY | Facility: CLINIC | Age: 25
End: 2025-03-20
Payer: COMMERCIAL

## 2025-03-20 VITALS
BODY MASS INDEX: 39.61 KG/M2 | TEMPERATURE: 98.2 F | HEART RATE: 68 BPM | OXYGEN SATURATION: 100 % | HEIGHT: 64 IN | SYSTOLIC BLOOD PRESSURE: 140 MMHG | WEIGHT: 232 LBS | DIASTOLIC BLOOD PRESSURE: 96 MMHG

## 2025-03-20 DIAGNOSIS — R33.9 URINARY RETENTION: ICD-10-CM

## 2025-03-20 DIAGNOSIS — K59.00 CONSTIPATION, UNSPECIFIED CONSTIPATION TYPE: ICD-10-CM

## 2025-03-20 DIAGNOSIS — N39.0 RECURRENT UTI: ICD-10-CM

## 2025-03-20 RX ORDER — CEPHALEXIN 250 MG/5ML
500 POWDER, FOR SUSPENSION ORAL
COMMUNITY
Start: 2025-03-18 | End: 2025-03-25

## 2025-03-20 RX ORDER — CIPROFLOXACIN 500 MG/1
500 TABLET, FILM COATED ORAL
COMMUNITY
Start: 2025-03-18 | End: 2025-03-25

## 2025-03-20 RX ORDER — POLYETHYLENE GLYCOL 3350 17 G/17G
17 POWDER, FOR SOLUTION ORAL DAILY
Qty: 510 G | Refills: 3 | Status: SHIPPED | OUTPATIENT
Start: 2025-03-20

## 2025-03-20 NOTE — PROGRESS NOTES
Fill and Void was performed on patient. 120 mL sterile water was inserted in catheter. Patient tolerated filling of bladder without any issues or complications. The balloon was deflated by removing 10 mL of water and catheter was removed. Patient tolerated catheter removal. Patient successfully voided approx. 120 mL. A post void residue (PVR) was scanned on patient and recorded 0 mL. Provider was informed of successful fill & void.

## 2025-03-20 NOTE — PROGRESS NOTES
"       Office Visit     Patient Name: Flavia Mcclure  : 2000   MRN: [id number]     Patient or patient representative verbalized consent for the use of Ambient Listening during the visit with  SAMSON Bonilla for chart documentation. 3/20/2025  13:54 EDT    Chief Complaint:   Chief Complaint   Patient presents with    Follow-up     Urinary retention     ER Follow up from [facility]      Referring Provider: No ref. provider found    Primary Care Provider: Lisa Cassidy MD     History of Present Illness  The patient is a 24-year-old female who presents for follow-up after an ER visit for urinary retention.    Constipation  - Persistent constipation, using enemas for relief  - Bowel movements every other day, hard and pellet-like stools  - Last seen by gastroenterologist a year ago, prescribed Colace  - Practices front-to-back wiping  - Does not take MiraLAX    Urinary Tract Infections (UTIs)  - 3 UTIs since 2025, managed at [facility] in [place], [place]  - Fluid intake: 2 sodas and 3 Propel moore daily, no plain water    Urinary Retention  - Currently on second catheter from ER, first replaced due to leakage  - History of urinary retention in , saw [facility]    Type 2 Diabetes  - Takes insulin and metformin  - Recent A1c 7.2      Subjective   Review of System:   As noted in HPI.    Past Medical History:   Diagnosis Date    Asthma     Bipolar disorder     Chronic bronchitis     Constipation 2025    Depression     Elevated cholesterol     Encounter for IUD insertion 2023    KYLEENA    Endometriosis     Female infertility     Frequent UTI     GERD (gastroesophageal reflux disease)     Hypothyroidism     \"born with only 1/2 my thyroid\"     Kidney infection     Migraines     PCOS (polycystic ovarian syndrome)     Polycystic ovary syndrome     PONV (postoperative nausea and vomiting)     Pre-existing type 2 diabetes affecting pregnancy, antepartum     dx age 13    Preeclampsia     " PTSD (post-traumatic stress disorder)     Recurrent pregnancy loss, antepartum condition or complication     Recurrent UTI 2025    Schizoaffective disorder     Urinary retention 2025     Past Surgical History:   Procedure Laterality Date    ANAL FISTULA REPAIR  2022     SECTION Bilateral 2021    Procedure:  SECTION PRIMARY;  Surgeon: Irineo Freire DO;  Location: [facility] LABOR DELIVERY;  Service: Obstetrics/Gynecology;  Laterality: Bilateral;    CHOLECYSTECTOMY      COLONOSCOPY      DIAGNOSTIC LAPAROSCOPY N/A 2022    Procedure: DIAGNOSTIC LAPAROSCOPY with Lysis of Adhesions;  Surgeon: Irineo Freire DO;  Location: [facility] OR;  Service: Obstetrics/Gynecology;  Laterality: N/A;    ENDOSCOPY      TONSILLECTOMY       Family History   Problem Relation Age of Onset    Asthma Maternal Grandmother     Asthma Paternal Grandmother     Cancer Other     Diabetes Other     Heart attack Other     Hyperlipidemia Other     Hypertension Other     Thyroid disease Other     Migraines Other      Social History     Socioeconomic History    Marital status:    Tobacco Use    Smoking status: Former     Current packs/day: 0.00     Types: Cigarettes     Quit date:      Years since quittin.2     Passive exposure: Never    Smokeless tobacco: Never   Vaping Use    Vaping status: Every Day    Substances: Nicotine, Flavoring    Devices: Pre-filled or refillable cartridge   Substance and Sexual Activity    Alcohol use: Not Currently     Comment: occasional    Drug use: Not Currently     Types: Marijuana     Comment: last use     Sexual activity: Not Currently     Partners: Male     Birth control/protection: None       Current Outpatient Medications:     atorvastatin (LIPITOR) 20 MG tablet, Take 1 tablet by mouth Daily. Indications: High Amount of Fats in the Blood, Disp: , Rfl:     cephALEXin (KEFLEX) 250 MG/5ML suspension, Take 10 mL by mouth., Disp: , Rfl:      "ciprofloxacin (CIPRO) 500 MG tablet, Take 1 tablet by mouth., Disp: , Rfl:     desvenlafaxine (Pristiq) 100 MG 24 hr tablet, Take 1 tablet by mouth Daily., Disp: 30 tablet, Rfl: 1    levothyroxine (SYNTHROID, LEVOTHROID) 100 MCG tablet, Take 1 tablet by mouth Daily. Indications: Underactive Thyroid, Disp: , Rfl:     Prenatal Vit-Fe Fumarate-FA (prenatal vitamin 28-0.8) 28-0.8 MG tablet tablet, Take 1 tablet by mouth Daily., Disp: 90 tablet, Rfl: 3    propranolol (INDERAL) 10 MG tablet, Take 1 tablet by mouth 2 (Two) Times a Day As Needed (panic)., Disp: 60 tablet, Rfl: 1    QUEtiapine XR (SEROquel XR) 300 MG 24 hr tablet, Take 1 tablet by mouth Every Night. Indications: Manic-Depression, Disp: , Rfl:     temazepam (RESTORIL) 30 MG capsule, Take 1 capsule by mouth Every Night. Indications: Trouble Sleeping, Disp: , Rfl:     topiramate (TOPAMAX) 25 MG tablet, Take 1 tablet by mouth Every Night. Indications: Body Weight Gain due to Antipsychotic Medication Use, bipolar, nightmares, Disp: 30 tablet, Rfl: 0    ziprasidone (GEODON) 20 MG capsule, Take 1 capsule by mouth 2 (Two) Times a Day With Meals. Indications: Manic-Depression, Disp: , Rfl:     ziprasidone (GEODON) 80 MG capsule, Take 1 capsule by mouth Every Night. Indications: Manic-Depression, Disp: , Rfl:     polyethylene glycol (MiraLax) 17 GM/SCOOP powder, Take 17 g by mouth Daily., Disp: 510 g, Rfl: 3    Allergies   Allergen Reactions    Morphine Anxiety    Latex Rash    Milk (Cow) Unknown (See Comments)    Tylenol [Acetaminophen] Hives and Itching     Reaction was caused by interaction with sleep med.      Objective   Visit Vitals  /96 (BP Location: Left arm, Patient Position: Sitting, Cuff Size: Adult)   Pulse 68   Temp 98.2 °F (36.8 °C) (Temporal)   Ht 162.6 cm (64.02\")   Wt 105 kg (232 lb)   SpO2 100%   BMI 39.80 kg/m²        Body mass index is 39.8 kg/m².     Physical Exam  Vitals and nursing note reviewed.   Constitutional:       General: She is not " in acute distress.     Appearance: Normal appearance. She is obese. She is not ill-appearing.   Pulmonary:      Effort: Pulmonary effort is normal. No respiratory distress.   Skin:     General: Skin is warm and dry.   Neurological:      General: No focal deficit present.      Mental Status: She is alert and oriented to person, place, and time.   Psychiatric:         Mood and Affect: Mood normal.         Behavior: Behavior normal.          Labs  Lab Results   Component Value Date    COLORU Yellow 11/13/2024    CLARITYU Cloudy (A) 11/13/2024    SPECGRAV 1.025 03/28/2024    PHUR 5.5 11/13/2024    LEUKOCYTESUR Trace (A) 11/13/2024    NITRITE Negative 03/28/2024    PROTEINPOCUA Trace (A) 03/28/2024    GLUCOSEUR Negative 03/28/2024    KETONESU Negative 11/13/2024    UROBILINOGEN 0.2 E.U./dL 11/13/2024    BILIRUBINUR Negative 11/13/2024    RBCUR Trace (A) 03/28/2024      Lab Results   Component Value Date    WBCUA 0-5 03/08/2025    WBCUA 6-10 (A) 11/13/2024    RBCUA 0-5 03/08/2025    RBCUA 0-2 11/13/2024    BACTERIA 3+ (A) 11/13/2024    BACTERIA 1+ (A) 03/28/2024    HYALCASTU 3-6 11/13/2024    HYALCASTU None Seen 12/03/2023    SQUAMEPIUA 0-5 03/08/2025    SQUAMEPIUA Too Numerous to Count (A) 11/13/2024      Urine Culture          11/13/2024    13:48   Urine Culture   Urine Culture <25,000 CFU/mL Normal Urogenital Herminia      Lab Results   Component Value Date    WBC 8.08 11/13/2024    HGB 14.7 11/13/2024    HCT 45.5 11/13/2024    MCV 87.2 11/13/2024     11/13/2024     Lab Results   Component Value Date    GLUCOSE 101 (H) 11/13/2024    CALCIUM 9.5 11/13/2024     (L) 11/13/2024    K 4.0 11/13/2024    CO2 21.4 (L) 11/13/2024     11/13/2024    BUN 7 11/13/2024    BUN 13 12/03/2023    CREATININE 0.88 11/13/2024    CREATININE 0.89 12/03/2023    EGFR 94.2 11/13/2024    EGFR 93.6 12/03/2023    BCR 8.0 11/13/2024    ANIONGAP 10.6 11/13/2024    ALT 53 (H) 11/13/2024    AST 34 (H) 11/13/2024     No results found  "for: \"HGBA1C\"  No results found for: \"URICACIDSTN\", \"HTZR2SYXJJB\", \"AJGQ5CKPVG\", \"LABMAGN\"  No results found for: \"BQKX10BT\", \"CAION\", \"PTH\", \"URICACID\"  Lab Results   Component Value Date    LABPH 6.0 03/28/2024       Lab Results   Component Value Date    CHLAMNAA Not Detected 11/13/2024    NGONORRHON Not Detected 11/13/2024       Lab Results   Component Value Date    FSH 7.63 09/30/2023    TSH 0.986 09/30/2023    FREET4 1.50 09/30/2023    CORTISOL 6.18 10/13/2023    TESTOSTEROTT 20 09/30/2023    TESTFRE 0.3 09/30/2023         Radiographic Studies  CT Abdomen Pelvis With Contrast  Result Date: 3/18/2025  Unremarkable. Images reviewed, interpreted and dictated by Dr. Arnaud Galvan MD      I have reviewed the above labs and imaging.     PVR  Post-void residual performed with ultrasound by staff and interpreted by me - 0 mL    Assessment / Plan      Diagnoses and all orders for this visit:    1. Urinary retention    2. Constipation, unspecified constipation type  -     Ambulatory Referral to Gastroenterology  -     polyethylene glycol (MiraLax) 17 GM/SCOOP powder; Take 17 g by mouth Daily.  Dispense: 510 g; Refill: 3    3. Recurrent UTI         Assessment & Plan  1. Urinary retention: Voiding trial today to assess ability to pass urine. If unsuccessful, instructed on self-catheterization twice daily, measure volumes. If successful, provided with catheters for home use, taught self-catheterization techniques. Urodynamics procedure scheduled to investigate potential neurogenic components related to diabetes. Contact office if issues arise before procedure.  - Voiding trial today passed successfully  - Self-catheterization instruction if voiding trial unsuccessful and recommend if patient has to start CIC call our office.  - Urodynamics procedure scheduled  - Contact office if issues arise before procedure    2. Constipation: Advised to consult gastroenterologist. Referral to gastroenterology department. Start MiraLAX 17 g " daily, increase fluid intake, including Propel water.  - Referral to gastroenterology  - Start MiraLAX 17 g daily  - Increase fluid intake, including Propel water    3. Recurrent urinary tract infections: Take cranberry supplements daily, 1-2 tablets, as preventative measure. Increase water intake to at least 64 ounces daily. Seek treatment for UTIs at our facility. Contact office immediately if UTI symptoms or difficulty emptying bladder.  - Take cranberry supplements daily, 1-2 tablets  - Increase water intake to at least 64 ounces daily  - Seek treatment for UTIs at our facility  - Contact office if UTI symptoms or difficulty emptying bladder      Follow-up  - Follow up with Dr. James after urodynamics procedure  - Follow up with gastroenterologist for constipation management    PROCEDURE  The patient is currently on her second catheter from the ER, with the first one being replaced due to leakage.       Return for Urodynamics and follow up with Dr. James.    Michael Gutiérrez, MSN, APRN, FNP-C  [facility]

## 2025-03-24 ENCOUNTER — TELEPHONE (OUTPATIENT)
Dept: UROLOGY | Facility: CLINIC | Age: 25
End: 2025-03-24

## 2025-03-24 NOTE — TELEPHONE ENCOUNTER
Caller: Flavia Mcclure    Relationship to patient: Self    Best call back number: [phone]    Chief complaint: URINARY RETENTION    Type of visit: URODYNAMICS    Requested date: ASAP     If rescheduling, when is the original appointment: N/A     Additional notes:PT WANTS TO KNOW HOW MANY TIMES SHE HAS TO SELF CATH BEFORE APPT. THANK  YOU

## 2025-05-01 ENCOUNTER — OFFICE VISIT (OUTPATIENT)
Dept: OBSTETRICS AND GYNECOLOGY | Facility: CLINIC | Age: 25
End: 2025-05-01
Payer: COMMERCIAL

## 2025-05-01 VITALS
BODY MASS INDEX: 38.41 KG/M2 | WEIGHT: 225 LBS | SYSTOLIC BLOOD PRESSURE: 138 MMHG | HEIGHT: 64 IN | DIASTOLIC BLOOD PRESSURE: 84 MMHG

## 2025-05-01 DIAGNOSIS — Z30.2 REQUEST FOR STERILIZATION: ICD-10-CM

## 2025-05-01 DIAGNOSIS — Z30.8 ENCOUNTER FOR OTHER CONTRACEPTIVE MANAGEMENT: ICD-10-CM

## 2025-05-01 DIAGNOSIS — N92.1 MENORRHAGIA WITH IRREGULAR CYCLE: Primary | ICD-10-CM

## 2025-05-01 DIAGNOSIS — R11.0 NAUSEA WITHOUT VOMITING: ICD-10-CM

## 2025-05-01 DIAGNOSIS — N94.10 DYSPAREUNIA IN FEMALE: ICD-10-CM

## 2025-05-01 PROCEDURE — 99214 OFFICE O/P EST MOD 30 MIN: CPT | Performed by: OBSTETRICS & GYNECOLOGY

## 2025-05-01 RX ORDER — LAMOTRIGINE 25 MG/1
TABLET ORAL
COMMUNITY
Start: 2025-04-21

## 2025-05-01 RX ORDER — VENLAFAXINE HYDROCHLORIDE 150 MG/1
CAPSULE, EXTENDED RELEASE ORAL
COMMUNITY
Start: 2025-04-21

## 2025-05-02 LAB — HCG INTACT+B SERPL-ACNC: <1 MIU/ML

## 2025-05-02 NOTE — PROGRESS NOTES
"Chief Complaint  Consult (Patient wants to discuss tubal ligation. )     History of Present Illness:  Patient is 24 y.o.  who presents to [facility] here for evaluation of tubal ligation.  Patient currently is not on any contraception.  She reports her last menstrual cycle was  through .  She reports her menstrual cycles are extremely heavy.  They will vary in timing.  She will change her pad every 30 minutes.  She is due for her menstrual cycle now.  She has been having nausea.  She is requesting an hCG level.  She does report having thyroid function test 2 weeks ago with her primary care provider which were normal.  She is certain she no longer desires childbearing.  She is desiring tubal ligation.  She is also inquiring regarding possible endometrial ablation for her menorrhagia.  She also has complaints of dyspareunia.  She reports having symptoms for the last 3 months.  She has pain with both insertion as well as deep penetration.  Patient has recently been seen at [facility] through the ER.  She reports having a gastroenteritis.  She did have labs as well as CT scan.  She had urine pregnancy test at that time.    History  Past Medical History:   Diagnosis Date    Asthma     Bipolar disorder     Chronic bronchitis     Constipation 2025    Depression     Elevated cholesterol     Encounter for IUD insertion 2023    KYLEENA    Endometriosis     Female infertility     Frequent UTI     GERD (gastroesophageal reflux disease)     Hypothyroidism     \"born with only 1/2 my thyroid\"     Kidney infection     Migraines     PCOS (polycystic ovarian syndrome)     Polycystic ovary syndrome     PONV (postoperative nausea and vomiting)     Pre-existing type 2 diabetes affecting pregnancy, antepartum     dx age 13    Preeclampsia     PTSD (post-traumatic stress disorder)     Recurrent pregnancy loss, antepartum condition or complication     Recurrent UTI 2025    " Schizoaffective disorder     Urinary incontinence     Urinary retention 03/20/2025     Current Outpatient Medications on File Prior to Visit   Medication Sig Dispense Refill    atorvastatin (LIPITOR) 20 MG tablet Take 1 tablet by mouth Daily. Indications: High Amount of Fats in the Blood      lamoTRIgine (LaMICtal) 25 MG tablet       levothyroxine (SYNTHROID, LEVOTHROID) 100 MCG tablet Take 1 tablet by mouth Daily. Indications: Underactive Thyroid      polyethylene glycol (MiraLax) 17 GM/SCOOP powder Take 17 g by mouth Daily. 510 g 3    Prenatal Vit-Fe Fumarate-FA (prenatal vitamin 28-0.8) 28-0.8 MG tablet tablet Take 1 tablet by mouth Daily. 90 tablet 3    propranolol (INDERAL) 10 MG tablet Take 1 tablet by mouth 2 (Two) Times a Day As Needed (panic). 60 tablet 1    QUEtiapine XR (SEROquel XR) 300 MG 24 hr tablet Take 200 mg by mouth Every Night. Indications: Manic-Depression      topiramate (TOPAMAX) 25 MG tablet Take 1 tablet by mouth Every Night. Indications: Body Weight Gain due to Antipsychotic Medication Use, bipolar, nightmares 30 tablet 0    venlafaxine XR (EFFEXOR-XR) 150 MG 24 hr capsule       ziprasidone (GEODON) 20 MG capsule Take 1 capsule by mouth 2 (Two) Times a Day With Meals. Indications: Manic-Depression      ziprasidone (GEODON) 80 MG capsule Take 1 capsule by mouth Every Night. Indications: Manic-Depression      desvenlafaxine (Pristiq) 100 MG 24 hr tablet Take 1 tablet by mouth Daily. (Patient not taking: Reported on 5/1/2025) 30 tablet 1    temazepam (RESTORIL) 30 MG capsule Take 1 capsule by mouth Every Night. Indications: Trouble Sleeping (Patient not taking: Reported on 5/1/2025)       No current facility-administered medications on file prior to visit.     Allergies   Allergen Reactions    Morphine Anxiety    Latex Rash    Milk (Cow) Unknown (See Comments)    Tylenol [Acetaminophen] Hives and Itching     Reaction was caused by interaction with sleep med.      Past Surgical History:  "  Procedure Laterality Date    ANAL FISTULA REPAIR  2022     SECTION Bilateral 2021    Procedure:  SECTION PRIMARY;  Surgeon: Irineo Freire DO;  Location: [facility] LABOR DELIVERY;  Service: Obstetrics/Gynecology;  Laterality: Bilateral;    CHOLECYSTECTOMY      COLONOSCOPY      DIAGNOSTIC LAPAROSCOPY N/A 2022    Procedure: DIAGNOSTIC LAPAROSCOPY with Lysis of Adhesions;  Surgeon: Irineo Freire DO;  Location: [facility] OR;  Service: Obstetrics/Gynecology;  Laterality: N/A;    ENDOSCOPY      TONSILLECTOMY       Family History   Problem Relation Age of Onset    Asthma Maternal Grandmother     Asthma Paternal Grandmother     Cancer Other     Diabetes Other     Heart attack Other     Hyperlipidemia Other     Hypertension Other     Thyroid disease Other     Migraines Other      Social History     Socioeconomic History    Marital status:    Tobacco Use    Smoking status: Former     Current packs/day: 0.00     Types: Cigarettes     Quit date:      Years since quittin.3     Passive exposure: Never    Smokeless tobacco: Never   Vaping Use    Vaping status: Every Day    Substances: Nicotine, Flavoring    Devices: Pre-filled or refillable cartridge   Substance and Sexual Activity    Alcohol use: Not Currently     Comment: occasional    Drug use: Not Currently     Types: Marijuana     Comment: last use     Sexual activity: Not Currently     Partners: Male     Birth control/protection: None       Physical Examination:  Vital Signs: /84   Ht 162.6 cm (64\")   Wt 102 kg (225 lb)   BMI 38.62 kg/m²     General Appearance: alert, appears stated age, and cooperative  Breasts: Not performed.  Abdomen: no masses, no hepatomegaly, no splenomegaly, soft non-tender, no guarding, and no rebound tenderness  Pelvic: Not performed.    Data Review:  The following data was reviewed by: Kandis Forrester MD on 2025:     Labs:  Lactic Acid with reflex (SJ) (2025 " 14:13)  COMPREHENSIVE METABOLIC PANEL (03/18/2025 14:13)  CBC with Auto Diff (03/18/2025 14:13)  Pregnancy, Urine - (03/18/2025 14:52)  Urinalysis, Microscopic Only - (03/18/2025 14:52)  Urinalysis, Reflex Microscopic and Culture If Indicated (03/18/2025 14:52)  Imaging:  CT Abdomen Pelvis With Contrast (03/18/2025 21:20)   Medical Records:  None    Assessment and Plan   1. Encounter for other contraceptive management  Contraceptive counseling was provided.  The various options for contraception was discussed including natural family planning, withdrawal method, barrier methods, spermicides, oral contraception, transdermal patch, vaginal ring, and injections.  The risks, complications, failure rates, and benefits of each were discussed.  Contraceptive counseling was provided regarding long-acting reversible contraception.  The patient was informed that intrauterine devices and contraceptives implants, long-acting reversible contraceptives (LARC), are the most effective reversible contraceptive methods.  The patient was informed there are five IUDs currently on the market in the [place]: the copper-containing IUD and four levonorgestrel-releasing IUDs.  The use of LARCs has increased during the past decade and the unintended pregnancy rate has decreased in part secondary to LARC use.  Continuation rates are higher as well in comparison to those who chose short-acting methods of contraception.  The unintended pregnancy rate for first year of use is less than 1 per 100 women; the implant is 0.05% and 0.2% for the levonorgestrel 52 IUD.  The unintended pregnancy rate first year of use for the copper T IUD is 0.8% but it has a higher discontinuation rate secondary to heavy menstrual bleeding and pain.  The patient was informed regarding the non-contraceptive benefits as well.  Benefits of the LNG IUD include reduction in heavy menstrual bleeding, anemia, dysmenorrhea, endometriosis-related pain, endometrial hyperplasia,  endometrial cancer, ovarian cancer, and cervical cancer.  The patient was informed the LNG 52 IUD is FDA approved for management of heavy menstrual bleeding. Non-contraceptive benefits for the implant include generally lighter menstrual bleeding, although irregular, and improvement in dysmenorrhea.  The patient was also informed regarding the duration of use of each with the Mirena IUD now being approved by the FDA for eight years of use for contraception.  The risks and complications associated with each device were also discussed.     2. Request for sterilization  I have reviewed with the patient the risks, complications, benefits, and other alternatives to surgery in detail.  The risks reviewed include but not limited to pain, numbness, swelling, and scarring from surgery.  Bleeding during or after the surgery which may require the need for blood transfusions or other treatment and surgery with additional risk as discussed.  Infection including wound infection, poor healing or reopening of the incision(s) as well as pelvic and/or abdominal infection, urinary tract infection, pneumonia, and sepsis.  Damage to bladder, ureters, bowel, and other pelvic or abdominal structures which may be discovered during the procedure or later requiring additional treatment and surgery.  Damage to ovaries, uterus, or other nearby structures.  Damage to nerves, blood vessels, and/or other structures in the surgical area.  Risk of DVTs, embolus from blood clots or gas if used during procedure.  The permanent nature of the procedure was discussed. The failure rate of the procedure with subsequent pregnancy with higher rate of ectopic gestation if tubal fails was also discussed. The patient was informed regarding complications and reactions to the anesthetic including heart attack, cardiac arrest, stroke, and death. The possibility of not being able to perform the procedure through the laparoscope or a small incision, requiring  laparotomy was also discussed.  All questions were answered.  Patient is in agreement with the planned procedure.   Patient to sign tubal papers today.    3. Nausea without vomiting  Labs today as discussed.  Plan pending results.  - HCG, B-subunit, Quantitative    4. Menorrhagia with irregular cycle  The patient was informed that menstrual flow outside of normal volume, duration, regularity, or frequency is considered abnormal uterine bleeding.  The patient was informed that the normal duration of menstrual flow is usually 5 days and the normal cycle typically lasts between 21-35 days.  The patient was informed that heavy menstrual bleeding has been defined as blood loss greater than 80 mL and given that this is hard to quantify excessive blood loss is based on the patient's perception. The patient was informed that AUB most frequently occurs in women aged 19-39 years as a result of pregnancy, structural lesions such as polyps or fibroids, anovulatory cycles, use of hormonal contraception, and endometrial hyperplasia.  She was counseled that endometrial cancer is less common but may occur.  It is recommended that she have a pregnancy test, CBC, and TSH.  Her pap smear needs to be up to date.  She needs screening for Chlamydia if she feels she is at high risk.  It is also recommended that she have a transvaginal ultrasound for further evaluation.  If anatomic abnormalities are noted then then surgery may be indicated. An endometrial ablation may also be an option to control bleeding in women who have completed childbearing.  The various options were discussed regarding medical management of her bleeding to include the use of nonsteroidal antiinflammatory drugs, progestins, combination oral contraceptives, a levonorgestrel intrauterine device, or tranexamic acid. The patient is informed if there is no improvement in her symptoms with medical management then she will need additional evaluation to include additional  laboratory assessment and endometrial sampling.  Patient has had recent thyroid function test.  Patient is unable to obtain a copy of her recent labs.  Transvaginal ultrasound following her next menstrual cycle for further evaluation.  I have discussed with the patient the possibility of endometrial ablation.  Plan pending results.  - HCG, B-subunit, Quantitative  - US Non-ob Transvaginal; Future    5. Dyspareunia in female  Patient with dyspareunia new onset as noted.  Patient to return for transvaginal ultrasound.  Patient to call for her lab results.  Plan diagnostic laparoscopy for further evaluation at the time of her tubal ligation as well.  - US Non-ob Transvaginal; Future    Follow Up/Instructions:  Follow up as noted.  Patient was given instructions and counseling regarding her condition or for health maintenance advice. Please see specific information pulled into the AVS if appropriate.     Note: Speech recognition transcription software may have been used to dictate portions of this document.  An attempt at proofreading has been made though minor errors in transcription may still be present.    This note was electronically signed.  Kandis Forrester M.D.

## 2025-06-05 ENCOUNTER — TRANSCRIBE ORDERS (OUTPATIENT)
Dept: ADMINISTRATIVE | Facility: HOSPITAL | Age: 25
End: 2025-06-05
Payer: COMMERCIAL

## 2025-06-05 ENCOUNTER — PREP FOR SURGERY (OUTPATIENT)
Dept: OTHER | Facility: HOSPITAL | Age: 25
End: 2025-06-05
Payer: COMMERCIAL

## 2025-06-05 DIAGNOSIS — R10.2 PELVIC PAIN: ICD-10-CM

## 2025-06-05 DIAGNOSIS — N64.4 MASTODYNIA: Primary | ICD-10-CM

## 2025-06-05 DIAGNOSIS — Z30.2 REQUEST FOR STERILIZATION: Primary | ICD-10-CM

## 2025-06-05 DIAGNOSIS — R13.19 CONSTANT LOW-GRADE DYSPHAGIA: ICD-10-CM

## 2025-06-05 RX ORDER — SODIUM CHLORIDE 0.9 % (FLUSH) 0.9 %
3 SYRINGE (ML) INJECTION EVERY 12 HOURS SCHEDULED
OUTPATIENT
Start: 2025-06-05

## 2025-06-05 RX ORDER — SODIUM CHLORIDE 9 MG/ML
40 INJECTION, SOLUTION INTRAVENOUS AS NEEDED
OUTPATIENT
Start: 2025-06-05

## 2025-06-05 RX ORDER — SODIUM CHLORIDE 0.9 % (FLUSH) 0.9 %
10 SYRINGE (ML) INJECTION AS NEEDED
OUTPATIENT
Start: 2025-06-05